# Patient Record
Sex: MALE | Race: WHITE | NOT HISPANIC OR LATINO | URBAN - METROPOLITAN AREA
[De-identification: names, ages, dates, MRNs, and addresses within clinical notes are randomized per-mention and may not be internally consistent; named-entity substitution may affect disease eponyms.]

---

## 2017-04-01 ENCOUNTER — OUTPATIENT (OUTPATIENT)
Dept: OUTPATIENT SERVICES | Facility: HOSPITAL | Age: 57
LOS: 1 days | End: 2017-04-01
Payer: MEDICAID

## 2017-04-01 DIAGNOSIS — S02.10XA UNSPECIFIED FRACTURE OF BASE OF SKULL, INITIAL ENCOUNTER FOR CLOSED FRACTURE: Chronic | ICD-10-CM

## 2017-04-01 DIAGNOSIS — S72.90XA UNSPECIFIED FRACTURE OF UNSPECIFIED FEMUR, INITIAL ENCOUNTER FOR CLOSED FRACTURE: Chronic | ICD-10-CM

## 2017-04-01 DIAGNOSIS — Z98.89 OTHER SPECIFIED POSTPROCEDURAL STATES: Chronic | ICD-10-CM

## 2017-04-13 DIAGNOSIS — R69 ILLNESS, UNSPECIFIED: ICD-10-CM

## 2017-06-01 PROCEDURE — G9001: CPT

## 2017-07-12 ENCOUNTER — EMERGENCY (EMERGENCY)
Facility: HOSPITAL | Age: 57
LOS: 1 days | Discharge: HOME | End: 2017-07-12
Attending: EMERGENCY MEDICINE | Admitting: EMERGENCY MEDICINE
Payer: MEDICARE

## 2017-07-12 VITALS
TEMPERATURE: 99 F | RESPIRATION RATE: 16 BRPM | HEIGHT: 64 IN | OXYGEN SATURATION: 98 % | SYSTOLIC BLOOD PRESSURE: 106 MMHG | WEIGHT: 139.99 LBS | HEART RATE: 50 BPM | DIASTOLIC BLOOD PRESSURE: 65 MMHG

## 2017-07-12 DIAGNOSIS — Z98.89 OTHER SPECIFIED POSTPROCEDURAL STATES: Chronic | ICD-10-CM

## 2017-07-12 DIAGNOSIS — S72.90XA UNSPECIFIED FRACTURE OF UNSPECIFIED FEMUR, INITIAL ENCOUNTER FOR CLOSED FRACTURE: Chronic | ICD-10-CM

## 2017-07-12 DIAGNOSIS — S02.10XA UNSPECIFIED FRACTURE OF BASE OF SKULL, INITIAL ENCOUNTER FOR CLOSED FRACTURE: Chronic | ICD-10-CM

## 2017-07-12 PROCEDURE — 99283 EMERGENCY DEPT VISIT LOW MDM: CPT | Mod: 25

## 2017-07-12 PROCEDURE — 72110 X-RAY EXAM L-2 SPINE 4/>VWS: CPT | Mod: 26

## 2017-07-12 PROCEDURE — 72110 X-RAY EXAM L-2 SPINE 4/>VWS: CPT

## 2017-07-12 PROCEDURE — 99284 EMERGENCY DEPT VISIT MOD MDM: CPT

## 2017-07-12 RX ORDER — IBUPROFEN 200 MG
600 TABLET ORAL ONCE
Qty: 0 | Refills: 0 | Status: COMPLETED | OUTPATIENT
Start: 2017-07-12 | End: 2017-07-12

## 2017-07-12 RX ORDER — KETOROLAC TROMETHAMINE 30 MG/ML
1 SYRINGE (ML) INJECTION
Qty: 20 | Refills: 0 | OUTPATIENT
Start: 2017-07-12 | End: 2017-07-17

## 2017-07-12 RX ADMIN — Medication 600 MILLIGRAM(S): at 16:59

## 2017-07-12 RX ADMIN — Medication 600 MILLIGRAM(S): at 16:58

## 2017-07-12 NOTE — ED PROVIDER NOTE - OBJECTIVE STATEMENT
57 y/o M pt with history of chronic back pain, HTN, hyperlipidemia presents to the ED c/o back pain. Pt is able to ambulate. Pt states he was lifting some cases of soda yesterday at work and the pain began after that. Denies numbness/tingling, urinary or defecatory symptoms. No further complaints at this time.

## 2017-07-12 NOTE — ED PROVIDER NOTE - MUSCULOSKELETAL, MLM
Spine appears normal, range of motion is not limited. able to ambulate and sit up. tenderness upon palpation to lumbar area of back.

## 2021-04-05 ENCOUNTER — APPOINTMENT (OUTPATIENT)
Dept: ORTHOPEDIC SURGERY | Facility: CLINIC | Age: 61
End: 2021-04-05

## 2021-04-06 ENCOUNTER — APPOINTMENT (OUTPATIENT)
Dept: ORTHOPEDIC SURGERY | Facility: CLINIC | Age: 61
End: 2021-04-06

## 2021-04-06 VITALS
OXYGEN SATURATION: 99 % | HEART RATE: 103 BPM | DIASTOLIC BLOOD PRESSURE: 69 MMHG | SYSTOLIC BLOOD PRESSURE: 100 MMHG | HEIGHT: 66 IN | WEIGHT: 154.32 LBS | BODY MASS INDEX: 24.8 KG/M2

## 2021-04-06 DIAGNOSIS — Z72.3 LACK OF PHYSICAL EXERCISE: ICD-10-CM

## 2021-04-06 DIAGNOSIS — Z87.19 PERSONAL HISTORY OF OTHER DISEASES OF THE DIGESTIVE SYSTEM: ICD-10-CM

## 2021-04-06 DIAGNOSIS — Z78.9 OTHER SPECIFIED HEALTH STATUS: ICD-10-CM

## 2021-04-06 DIAGNOSIS — Z87.448 PERSONAL HISTORY OF OTHER DISEASES OF URINARY SYSTEM: ICD-10-CM

## 2021-04-06 DIAGNOSIS — Z86.79 PERSONAL HISTORY OF OTHER DISEASES OF THE CIRCULATORY SYSTEM: ICD-10-CM

## 2021-04-06 DIAGNOSIS — Z60.2 PROBLEMS RELATED TO LIVING ALONE: ICD-10-CM

## 2021-04-06 DIAGNOSIS — F17.210 NICOTINE DEPENDENCE, CIGARETTES, UNCOMPLICATED: ICD-10-CM

## 2021-04-06 DIAGNOSIS — Z56.0 UNEMPLOYMENT, UNSPECIFIED: ICD-10-CM

## 2021-04-06 PROBLEM — Z00.00 ENCOUNTER FOR PREVENTIVE HEALTH EXAMINATION: Status: ACTIVE | Noted: 2021-04-06

## 2021-04-06 RX ORDER — GUARANA 1000 MG
TABLET ORAL
Refills: 0 | Status: ACTIVE | COMMUNITY

## 2021-04-06 RX ORDER — LISINOPRIL 30 MG/1
TABLET ORAL
Refills: 0 | Status: ACTIVE | COMMUNITY

## 2021-04-06 SDOH — ECONOMIC STABILITY - INCOME SECURITY: UNEMPLOYMENT, UNSPECIFIED: Z56.0

## 2021-04-06 SDOH — SOCIAL STABILITY - SOCIAL INSECURITY: PROBLEMS RELATED TO LIVING ALONE: Z60.2

## 2021-04-09 ENCOUNTER — INPATIENT (INPATIENT)
Facility: HOSPITAL | Age: 61
LOS: 4 days | Discharge: HOME | End: 2021-04-14
Attending: HOSPITALIST | Admitting: HOSPITALIST
Payer: MEDICARE

## 2021-04-09 VITALS
HEART RATE: 88 BPM | TEMPERATURE: 97 F | RESPIRATION RATE: 17 BRPM | OXYGEN SATURATION: 100 % | SYSTOLIC BLOOD PRESSURE: 103 MMHG | DIASTOLIC BLOOD PRESSURE: 77 MMHG

## 2021-04-09 DIAGNOSIS — Z98.890 OTHER SPECIFIED POSTPROCEDURAL STATES: Chronic | ICD-10-CM

## 2021-04-09 DIAGNOSIS — K74.60 UNSPECIFIED CIRRHOSIS OF LIVER: ICD-10-CM

## 2021-04-09 DIAGNOSIS — Z98.89 OTHER SPECIFIED POSTPROCEDURAL STATES: Chronic | ICD-10-CM

## 2021-04-09 DIAGNOSIS — S72.90XA UNSPECIFIED FRACTURE OF UNSPECIFIED FEMUR, INITIAL ENCOUNTER FOR CLOSED FRACTURE: Chronic | ICD-10-CM

## 2021-04-09 DIAGNOSIS — N18.9 CHRONIC KIDNEY DISEASE, UNSPECIFIED: ICD-10-CM

## 2021-04-09 DIAGNOSIS — R14.0 ABDOMINAL DISTENSION (GASEOUS): ICD-10-CM

## 2021-04-09 DIAGNOSIS — S02.10XA UNSPECIFIED FRACTURE OF BASE OF SKULL, INITIAL ENCOUNTER FOR CLOSED FRACTURE: Chronic | ICD-10-CM

## 2021-04-09 DIAGNOSIS — I10 ESSENTIAL (PRIMARY) HYPERTENSION: ICD-10-CM

## 2021-04-09 DIAGNOSIS — Z29.9 ENCOUNTER FOR PROPHYLACTIC MEASURES, UNSPECIFIED: ICD-10-CM

## 2021-04-09 DIAGNOSIS — R94.31 ABNORMAL ELECTROCARDIOGRAM [ECG] [EKG]: ICD-10-CM

## 2021-04-09 LAB
ALBUMIN SERPL ELPH-MCNC: 2.9 G/DL — LOW (ref 3.3–5)
ALP SERPL-CCNC: 80 U/L — SIGNIFICANT CHANGE UP (ref 40–120)
ALT FLD-CCNC: 27 U/L — SIGNIFICANT CHANGE UP (ref 4–41)
ANION GAP SERPL CALC-SCNC: 12 MMOL/L — SIGNIFICANT CHANGE UP (ref 7–14)
APTT BLD: 34.4 SEC — SIGNIFICANT CHANGE UP (ref 27–36.3)
AST SERPL-CCNC: 40 U/L — SIGNIFICANT CHANGE UP (ref 4–40)
BASOPHILS # BLD AUTO: 0.1 K/UL — SIGNIFICANT CHANGE UP (ref 0–0.2)
BASOPHILS NFR BLD AUTO: 1.7 % — SIGNIFICANT CHANGE UP (ref 0–2)
BILIRUB SERPL-MCNC: 1 MG/DL — SIGNIFICANT CHANGE UP (ref 0.2–1.2)
BUN SERPL-MCNC: 21 MG/DL — SIGNIFICANT CHANGE UP (ref 7–23)
CALCIUM SERPL-MCNC: 8.5 MG/DL — SIGNIFICANT CHANGE UP (ref 8.4–10.5)
CHLORIDE SERPL-SCNC: 104 MMOL/L — SIGNIFICANT CHANGE UP (ref 98–107)
CO2 SERPL-SCNC: 17 MMOL/L — LOW (ref 22–31)
CREAT SERPL-MCNC: 1.19 MG/DL — SIGNIFICANT CHANGE UP (ref 0.5–1.3)
EOSINOPHIL # BLD AUTO: 0.05 K/UL — SIGNIFICANT CHANGE UP (ref 0–0.5)
EOSINOPHIL NFR BLD AUTO: 0.9 % — SIGNIFICANT CHANGE UP (ref 0–6)
GLUCOSE SERPL-MCNC: 113 MG/DL — HIGH (ref 70–99)
HCT VFR BLD CALC: 32.9 % — LOW (ref 39–50)
HGB BLD-MCNC: 10.5 G/DL — LOW (ref 13–17)
IANC: 3.22 K/UL — SIGNIFICANT CHANGE UP (ref 1.5–8.5)
INR BLD: 1.24 RATIO — HIGH (ref 0.88–1.16)
LYMPHOCYTES # BLD AUTO: 0.85 K/UL — LOW (ref 1–3.3)
LYMPHOCYTES # BLD AUTO: 14 % — SIGNIFICANT CHANGE UP (ref 13–44)
MCHC RBC-ENTMCNC: 31.9 GM/DL — LOW (ref 32–36)
MCHC RBC-ENTMCNC: 35.1 PG — HIGH (ref 27–34)
MCV RBC AUTO: 110 FL — HIGH (ref 80–100)
MONOCYTES # BLD AUTO: 0.27 K/UL — SIGNIFICANT CHANGE UP (ref 0–0.9)
MONOCYTES NFR BLD AUTO: 4.4 % — SIGNIFICANT CHANGE UP (ref 2–14)
NEUTROPHILS # BLD AUTO: 4.47 K/UL — SIGNIFICANT CHANGE UP (ref 1.8–7.4)
NEUTROPHILS NFR BLD AUTO: 73.7 % — SIGNIFICANT CHANGE UP (ref 43–77)
NT-PROBNP SERPL-SCNC: 757 PG/ML — HIGH
PLATELET # BLD AUTO: 39 K/UL — LOW (ref 150–400)
POTASSIUM SERPL-MCNC: 4.5 MMOL/L — SIGNIFICANT CHANGE UP (ref 3.5–5.3)
POTASSIUM SERPL-SCNC: 4.5 MMOL/L — SIGNIFICANT CHANGE UP (ref 3.5–5.3)
PROT SERPL-MCNC: 7 G/DL — SIGNIFICANT CHANGE UP (ref 6–8.3)
PROTHROM AB SERPL-ACNC: 14 SEC — HIGH (ref 10.6–13.6)
RBC # BLD: 2.99 M/UL — LOW (ref 4.2–5.8)
RBC # FLD: 15.9 % — HIGH (ref 10.3–14.5)
SODIUM SERPL-SCNC: 133 MMOL/L — LOW (ref 135–145)
TROPONIN T, HIGH SENSITIVITY RESULT: 38 NG/L — SIGNIFICANT CHANGE UP
TROPONIN T, HIGH SENSITIVITY RESULT: 39 NG/L — SIGNIFICANT CHANGE UP
WBC # BLD: 6.06 K/UL — SIGNIFICANT CHANGE UP (ref 3.8–10.5)
WBC # FLD AUTO: 6.06 K/UL — SIGNIFICANT CHANGE UP (ref 3.8–10.5)

## 2021-04-09 PROCEDURE — 99285 EMERGENCY DEPT VISIT HI MDM: CPT

## 2021-04-09 PROCEDURE — 99223 1ST HOSP IP/OBS HIGH 75: CPT

## 2021-04-09 PROCEDURE — 71045 X-RAY EXAM CHEST 1 VIEW: CPT | Mod: 26

## 2021-04-09 PROCEDURE — 74177 CT ABD & PELVIS W/CONTRAST: CPT | Mod: 26

## 2021-04-09 RX ORDER — LACTULOSE 10 G/15ML
10 SOLUTION ORAL
Refills: 0 | Status: DISCONTINUED | OUTPATIENT
Start: 2021-04-09 | End: 2021-04-14

## 2021-04-09 RX ORDER — LISINOPRIL 2.5 MG/1
10 TABLET ORAL DAILY
Refills: 0 | Status: DISCONTINUED | OUTPATIENT
Start: 2021-04-09 | End: 2021-04-10

## 2021-04-09 RX ORDER — FUROSEMIDE 40 MG
40 TABLET ORAL DAILY
Refills: 0 | Status: DISCONTINUED | OUTPATIENT
Start: 2021-04-09 | End: 2021-04-10
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
## 2021-04-09 NOTE — H&P ADULT - NSHPPHYSICALEXAM_GEN_ALL_CORE
T(C): 36.7 (04-09-21 @ 20:26), Max: 36.7 (04-09-21 @ 20:26)  HR: 86 (04-09-21 @ 20:26) (86 - 88)  BP: 98/62 (04-09-21 @ 20:26) (93/70 - 103/77)  RR: 16 (04-09-21 @ 20:26) (16 - 17)  SpO2: 100% (04-09-21 @ 20:26) (99% - 100%)  Wt(kg): --  GENERAL: NAD, well-developed  HEAD:  Atraumatic, Normocephalic  EYES: EOMI, PERRLA, conjunctiva and sclera clear  NECK: Supple, No JVD  CHEST/LUNG: Clear to auscultation bilaterally; No wheeze  HEART: Regular rate and rhythm; No murmurs, rubs, or gallops  ABDOMEN: soft, distended, tenderness on deep palpation  EXTREMITIES:  LE swelling bilaterally  PSYCH: AAOx3  NEUROLOGY: non-focal  SKIN: No rashes or lesions
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
## 2021-04-09 NOTE — H&P ADULT - PROBLEM SELECTOR PLAN 5
- Hold DVt ppx before paracentesis. Patient has IVC filter in place confirmed on CT a/p. (patient reported no bleeding history and does not remember the reason for IVC filter placement at present.)  - Precautions ordered.

## 2021-04-09 NOTE — ED ADULT NURSE NOTE - NSIMPLEMENTINTERV_GEN_ALL_ED
Implemented All Universal Safety Interventions:  [place] to call system. Call bell, personal items and telephone within reach. Instruct patient to call for assistance. Room bathroom lighting operational. Non-slip footwear when patient is off stretcher. Physically safe environment: no spills, clutter or unnecessary equipment. Stretcher in lowest position, wheels locked, appropriate side rails in place.

## 2021-04-09 NOTE — H&P ADULT - NSHPREVIEWOFSYSTEMS_GEN_ALL_CORE
CONSTITUTIONAL: weakness, no fevers or chills  EYES/ENT: No visual changes;  No vertigo or throat pain   NECK: No pain or stiffness  RESPIRATORY: sob with exertion  CARDIOVASCULAR: No chest pain or palpitations  GASTROINTESTINAL: abd distension with pain  GENITOURINARY: No dysuria, frequency or hematuria  NEUROLOGICAL: No numbness or weakness  SKIN: No itching, burning, rashes, or lesions   PSYCH: No Depression, no anxiety  All other review of systems is negative unless indicated above.

## 2021-04-09 NOTE — H&P ADULT - PROBLEM SELECTOR PLAN 4
- Noted low voltage on EKG (likely sinus rhythm per writer's read).  - In the context of ascites, concerning for pericardial effusion. Will check TTE.

## 2021-04-09 NOTE — ED ADULT NURSE REASSESSMENT NOTE - NS ED NURSE REASSESS COMMENT FT1
Pt received from day shift RN. Vitals as noted. Pt in no acute distress at this time. Respirations even and unlabored. Comfort measures provided.

## 2021-04-09 NOTE — ED PROVIDER NOTE - ATTENDING CONTRIBUTION TO CARE
I performed a history and physical exam of the patient and discussed their management with the advanced care provider. I reviewed the advanced care provider's note and agree with the documented findings and plan of care. My medical decision making and objective findings are found above.     I performed a history and physical exam of the patient and discussed their management with the resident. I reviewed the resident's note and agree with the documented findings and plan of care. My medical decision making and observations are found above.

## 2021-04-09 NOTE — ED PROVIDER NOTE - CARDIAC, MLM
Normal rate, regular rhythm.  Heart sounds S1, S2.  No murmurs, rubs or gallops. +b/l pitting LE edema.

## 2021-04-09 NOTE — ED ADULT TRIAGE NOTE - CHIEF COMPLAINT QUOTE
Pt c/o L. back radiating to abd pain, abd distention since 3-4 months ago. Denies n/v, fever. Sent my PMD for hx of alcohol cirrhosis, sent for possible paracentesis and further eval.

## 2021-04-09 NOTE — ED PROVIDER NOTE - PROGRESS NOTE DETAILS
Resident Yifan: preliminary evaluation consistent with likely cirrhosis related ascites, not acutely concerning for SBP (no WBC, afebrile, abdominal distension has been present over last 3-4 months) - will probably benefit from in-patient therapeutic para. Case endorsed to Hospitalist (MD James). Stable for tele admission.

## 2021-04-09 NOTE — CHART NOTE - NSCHARTNOTEFT_GEN_A_CORE
Called by RN for soft bp's. Pt has had low bp's since 530pm. Pt asymptomatic. Pt stated that his pressure is usually higher. Pt offers no complaints. Pt afebrile, no wbc.     ICU Vital Signs Last 24 Hrs  T(C): 36.8 (09 Apr 2021 23:13), Max: 36.8 (09 Apr 2021 23:13)  T(F): 98.2 (09 Apr 2021 23:13), Max: 98.2 (09 Apr 2021 23:13)  HR: 87 (09 Apr 2021 23:13) (86 - 89)  BP: 90/61 (09 Apr 2021 23:13) (90/61 - 103/77)  BP(mean): --  ABP: --  ABP(mean): --  RR: 18 (09 Apr 2021 23:13) (16 - 18)  SpO2: 100% (09 Apr 2021 23:13) (99% - 100%)      -continue to monitor bp  -call primary team if sbp <90 or pt should become symptomatic

## 2021-04-09 NOTE — H&P ADULT - NSHPLABSRESULTS_GEN_ALL_CORE
(04-09 @ 18:07)                      10.5  6.06 )-----------( 39                 32.9    Neutrophils = 4.47 (73.7%)  Lymphocytes = 0.85 (14.0%)  Eosinophils = 0.05 (0.9%)  Basophils = 0.10 (1.7%)  Monocytes = 0.27 (4.4%)  Bands = --%    04-09    133<L>  |  104  |  21  ----------------------------<  113<H>  4.5   |  17<L>  |  1.19    Ca    8.5      09 Apr 2021 18:07    TPro  7.0  /  Alb  2.9<L>  /  TBili  1.0  /  DBili  x   /  AST  40  /  ALT  27  /  AlkPhos  80  04-09    ( 09 Apr 2021 18:07 )   PT: 14.0 sec;   INR: 1.24 ratio;    PTT:34.4 sec
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
## 2021-04-09 NOTE — H&P ADULT - PROBLEM SELECTOR PLAN 1
- MELD score 11 with 6% 3 month mortality risk. Will need to consult hepatology in the am.  - C/w lasix for now.  - Will likely require procedure team for paracentesis for symptomatic relief.  - Currently no signs suggestive of SBP or varices.  - Serial abd exam.  - Stool softener as needed.  - Anemia likely ACD in etiology. Will send iron studies in the am to confirm. Monitor for bleeding signs.

## 2021-04-10 LAB
ANION GAP SERPL CALC-SCNC: 13 MMOL/L — SIGNIFICANT CHANGE UP (ref 7–14)
BUN SERPL-MCNC: 23 MG/DL — SIGNIFICANT CHANGE UP (ref 7–23)
CALCIUM SERPL-MCNC: 7.9 MG/DL — LOW (ref 8.4–10.5)
CHLORIDE SERPL-SCNC: 102 MMOL/L — SIGNIFICANT CHANGE UP (ref 98–107)
CO2 SERPL-SCNC: 17 MMOL/L — LOW (ref 22–31)
CREAT SERPL-MCNC: 1.19 MG/DL — SIGNIFICANT CHANGE UP (ref 0.5–1.3)
FERRITIN SERPL-MCNC: 544 NG/ML — HIGH (ref 30–400)
GLUCOSE SERPL-MCNC: 143 MG/DL — HIGH (ref 70–99)
HCV AB S/CO SERPL IA: 0.1 S/CO — SIGNIFICANT CHANGE UP (ref 0–0.99)
HCV AB SERPL-IMP: SIGNIFICANT CHANGE UP
IRON SATN MFR SERPL: 15 % — SIGNIFICANT CHANGE UP (ref 14–50)
IRON SATN MFR SERPL: 33 UG/DL — LOW (ref 45–165)
MAGNESIUM SERPL-MCNC: 1.2 MG/DL — LOW (ref 1.6–2.6)
PHOSPHATE SERPL-MCNC: 3.2 MG/DL — SIGNIFICANT CHANGE UP (ref 2.5–4.5)
POTASSIUM SERPL-MCNC: 3.8 MMOL/L — SIGNIFICANT CHANGE UP (ref 3.5–5.3)
POTASSIUM SERPL-SCNC: 3.8 MMOL/L — SIGNIFICANT CHANGE UP (ref 3.5–5.3)
RBC # BLD: 2.47 M/UL — LOW (ref 4.2–5.8)
RETICS #: 65.5 K/UL — SIGNIFICANT CHANGE UP (ref 25–125)
RETICS/RBC NFR: 2.7 % — HIGH (ref 0.5–2.5)
SARS-COV-2 RNA SPEC QL NAA+PROBE: SIGNIFICANT CHANGE UP
SODIUM SERPL-SCNC: 132 MMOL/L — LOW (ref 135–145)
TIBC SERPL-MCNC: 214 UG/DL — LOW (ref 220–430)
TRANSFERRIN SERPL-MCNC: 164 MG/DL — LOW (ref 200–360)
TROPONIN T, HIGH SENSITIVITY RESULT: 39 NG/L — SIGNIFICANT CHANGE UP
UIBC SERPL-MCNC: 181 UG/DL — SIGNIFICANT CHANGE UP (ref 110–370)

## 2021-04-10 PROCEDURE — 93975 VASCULAR STUDY: CPT | Mod: 26

## 2021-04-10 PROCEDURE — 99232 SBSQ HOSP IP/OBS MODERATE 35: CPT

## 2021-04-10 RX ORDER — LOPERAMIDE HCL 2 MG
2 TABLET ORAL ONCE
Refills: 0 | Status: COMPLETED | OUTPATIENT
Start: 2021-04-10 | End: 2021-04-10

## 2021-04-10 RX ORDER — MAGNESIUM SULFATE 500 MG/ML
2 VIAL (ML) INJECTION ONCE
Refills: 0 | Status: COMPLETED | OUTPATIENT
Start: 2021-04-10 | End: 2021-04-10

## 2021-04-10 RX ORDER — SPIRONOLACTONE 25 MG/1
50 TABLET, FILM COATED ORAL DAILY
Refills: 0 | Status: DISCONTINUED | OUTPATIENT
Start: 2021-04-11 | End: 2021-04-14

## 2021-04-10 RX ORDER — FUROSEMIDE 40 MG
20 TABLET ORAL DAILY
Refills: 0 | Status: DISCONTINUED | OUTPATIENT
Start: 2021-04-11 | End: 2021-04-14

## 2021-04-10 RX ORDER — ACETAMINOPHEN 500 MG
975 TABLET ORAL ONCE
Refills: 0 | Status: COMPLETED | OUTPATIENT
Start: 2021-04-10 | End: 2021-04-10

## 2021-04-10 RX ORDER — LIDOCAINE HCL 20 MG/ML
20 VIAL (ML) INJECTION ONCE
Refills: 0 | Status: DISCONTINUED | OUTPATIENT
Start: 2021-04-10 | End: 2021-04-14

## 2021-04-10 RX ORDER — SPIRONOLACTONE 25 MG/1
50 TABLET, FILM COATED ORAL ONCE
Refills: 0 | Status: COMPLETED | OUTPATIENT
Start: 2021-04-10 | End: 2021-04-10

## 2021-04-10 RX ADMIN — Medication 2 MILLIGRAM(S): at 00:59

## 2021-04-10 RX ADMIN — Medication 975 MILLIGRAM(S): at 22:55

## 2021-04-10 RX ADMIN — SPIRONOLACTONE 50 MILLIGRAM(S): 25 TABLET, FILM COATED ORAL at 18:06

## 2021-04-10 RX ADMIN — Medication 50 GRAM(S): at 18:05

## 2021-04-10 RX ADMIN — Medication 975 MILLIGRAM(S): at 20:49

## 2021-04-10 RX ADMIN — Medication 40 MILLIGRAM(S): at 06:03
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
## 2021-04-10 NOTE — PROGRESS NOTE ADULT - PROBLEM SELECTOR PLAN 1
MELD score 11 with 6% 3 month mortality risk.  Reports has known about cirrhosis but never had ascites.  Came from [facility] 1 wk ago but [place] lived [place].   - c/w Lasix, will change to 20 mg and add spironolactone   - 1.2L fluid restrict  - check Hep B and C serologies   - check RUQ w/ doppler to r/o PVT  - needs hepatology eval for OP f/u  - stool count, lactulose prn, assure daily BM MELD score 11 with 6% 3 month mortality risk.  Reports has known about cirrhosis but never had ascites.  Came from [facility] 1 wk ago [place] lived [place].   - c/w Lasix, will change to 20 mg and add spironolactone   - 1.2L fluid restrict  - check Hep B and C serologies   - check RUQ w/ doppler to r/o PVT  - needs hepatology eval for OP f/u  - stool count, lactulose prn, assure daily BM  - would benefit from diagnostic tap to r/o SBP (low suspicion) & confirm ascites from portal HTN and eventual therapeutic without urgency as pt appears in NAD

## 2021-04-10 NOTE — PROGRESS NOTE ADULT - SUBJECTIVE AND OBJECTIVE BOX
Patient is a 60y old  Male who presents with a chief complaint of SOB    SUBJECTIVE / OVERNIGHT EVENTS:    Feels about same, reports no prior para, fluid accumulating over time  No CP, SOB, f/c/n, reports loose stool yesterday which is not uncommon  No abdominal pain, MELCHOR     MEDICATIONS  (STANDING):  furosemide Tablet 40 milliGRAM(s) Oral daily  lidocaine 1% Injectable 20 milliLiter(s) Local Injection once    MEDICATIONS  (PRN):  lactulose Syrup 10 Gram(s) Oral two times a day PRN constipation    T(C): 36.6 (04-10-21 @ 11:54), Max: 36.8 (04-09-21 @ 23:13)  HR: 90 (04-10-21 @ 11:54) (84 - 100)  BP: 97/68 (04-10-21 @ 11:54) (90/61 - 105/64)  RR: 17 (04-10-21 @ 11:54) (16 - 18)  SpO2: 100% (04-10-21 @ 11:54) (99% - 100%)    I&O's Summary    09 Apr 2021 07:01  -  10 Apr 2021 07:00  --------------------------------------------------------  IN: 900 mL / OUT: 450 mL / NET: 450 mL    PHYSICAL EXAM:  GENERAL:  NAD, thin   CHEST/LUNG: Clear to auscultation bilaterally; No wheeze  HEART: Regular rate and rhythm; No murmurs, rubs, or gallops  ABDOMEN: soft, distended; +BS, fluid wave  EXTREMITIES: warm and well perfused, No clubbing, cyanosis, or edema  PSYCH: AAOx3  NEUROLOGY: non-focal, no asterixis   SKIN: No rashes or lesions    LABS:                        10.5   6.06  )-----------( 39       ( 09 Apr 2021 18:07 )             32.9     04-09    133<L>  |  104  |  21  ----------------------------<  113<H>  4.5   |  17<L>  |  1.19    Ca    8.5      09 Apr 2021 18:07    TPro  7.0  /  Alb  2.9<L>  /  TBili  1.0  /  DBili  x   /  AST  40  /  ALT  27  /  AlkPhos  80  04-09    PT/INR - ( 09 Apr 2021 18:07 )   PT: 14.0 sec;   INR: 1.24 ratio    PTT - ( 09 Apr 2021 18:07 )  PTT:34.4 sec    Consultant(s) Notes Reviewed:    Care Discussed with Consultants/Other Providers:

## 2021-04-11 LAB
ALBUMIN SERPL ELPH-MCNC: 1.9 G/DL — LOW (ref 3.3–5)
ALP SERPL-CCNC: 69 U/L — SIGNIFICANT CHANGE UP (ref 40–120)
ALT FLD-CCNC: 20 U/L — SIGNIFICANT CHANGE UP (ref 4–41)
ANION GAP SERPL CALC-SCNC: 10 MMOL/L — SIGNIFICANT CHANGE UP (ref 7–14)
AST SERPL-CCNC: 32 U/L — SIGNIFICANT CHANGE UP (ref 4–40)
BILIRUB DIRECT SERPL-MCNC: <0.2 MG/DL — SIGNIFICANT CHANGE UP (ref 0–0.2)
BILIRUB INDIRECT FLD-MCNC: >0.2 MG/DL — SIGNIFICANT CHANGE UP (ref 0–1)
BILIRUB SERPL-MCNC: 0.4 MG/DL — SIGNIFICANT CHANGE UP (ref 0.2–1.2)
BLD GP AB SCN SERPL QL: NEGATIVE — SIGNIFICANT CHANGE UP
BUN SERPL-MCNC: 23 MG/DL — SIGNIFICANT CHANGE UP (ref 7–23)
CALCIUM SERPL-MCNC: 7.7 MG/DL — LOW (ref 8.4–10.5)
CHLORIDE SERPL-SCNC: 106 MMOL/L — SIGNIFICANT CHANGE UP (ref 98–107)
CO2 SERPL-SCNC: 14 MMOL/L — LOW (ref 22–31)
COVID-19 SPIKE DOMAIN AB INTERP: NEGATIVE — SIGNIFICANT CHANGE UP
COVID-19 SPIKE DOMAIN ANTIBODY RESULT: 0.4 U/ML — SIGNIFICANT CHANGE UP
CREAT SERPL-MCNC: 1.18 MG/DL — SIGNIFICANT CHANGE UP (ref 0.5–1.3)
FOLATE SERPL-MCNC: 20 NG/ML — HIGH (ref 3.1–17.5)
GIANT PLATELETS BLD QL SMEAR: PRESENT — SIGNIFICANT CHANGE UP
GLUCOSE SERPL-MCNC: 93 MG/DL — SIGNIFICANT CHANGE UP (ref 70–99)
HBV CORE AB SER-ACNC: SIGNIFICANT CHANGE UP
HBV SURFACE AB SER-ACNC: SIGNIFICANT CHANGE UP
HBV SURFACE AG SER-ACNC: SIGNIFICANT CHANGE UP
HCT VFR BLD CALC: 26.2 % — LOW (ref 39–50)
HCT VFR BLD CALC: 27.3 % — LOW (ref 39–50)
HGB BLD-MCNC: 8.2 G/DL — LOW (ref 13–17)
HGB BLD-MCNC: 8.8 G/DL — LOW (ref 13–17)
LG PLATELETS BLD QL AUTO: SLIGHT — SIGNIFICANT CHANGE UP
MAGNESIUM SERPL-MCNC: 1.7 MG/DL — SIGNIFICANT CHANGE UP (ref 1.6–2.6)
MCHC RBC-ENTMCNC: 31.3 GM/DL — LOW (ref 32–36)
MCHC RBC-ENTMCNC: 32.2 GM/DL — SIGNIFICANT CHANGE UP (ref 32–36)
MCHC RBC-ENTMCNC: 34.7 PG — HIGH (ref 27–34)
MCHC RBC-ENTMCNC: 35.3 PG — HIGH (ref 27–34)
MCV RBC AUTO: 109.6 FL — HIGH (ref 80–100)
MCV RBC AUTO: 111 FL — HIGH (ref 80–100)
NRBC # BLD: 0 /100 WBCS — SIGNIFICANT CHANGE UP
NRBC # BLD: 0 /100 WBCS — SIGNIFICANT CHANGE UP
NRBC # FLD: 0 K/UL — SIGNIFICANT CHANGE UP
NRBC # FLD: 0 K/UL — SIGNIFICANT CHANGE UP
OSMOLALITY UR: 453 MOSM/KG — SIGNIFICANT CHANGE UP (ref 50–1200)
PHOSPHATE SERPL-MCNC: 3.2 MG/DL — SIGNIFICANT CHANGE UP (ref 2.5–4.5)
PLAT MORPH BLD: ABNORMAL
PLATELET # BLD AUTO: 25 K/UL — LOW (ref 150–400)
PLATELET # BLD AUTO: 27 K/UL — LOW (ref 150–400)
PLATELET COUNT - ESTIMATE: ABNORMAL
POTASSIUM SERPL-MCNC: 3.9 MMOL/L — SIGNIFICANT CHANGE UP (ref 3.5–5.3)
POTASSIUM SERPL-SCNC: 3.9 MMOL/L — SIGNIFICANT CHANGE UP (ref 3.5–5.3)
PROT SERPL-MCNC: 5.7 G/DL — LOW (ref 6–8.3)
RBC # BLD: 2.36 M/UL — LOW (ref 4.2–5.8)
RBC # BLD: 2.49 M/UL — LOW (ref 4.2–5.8)
RBC # FLD: 15.3 % — HIGH (ref 10.3–14.5)
RBC # FLD: 15.5 % — HIGH (ref 10.3–14.5)
RBC BLD AUTO: NORMAL — SIGNIFICANT CHANGE UP
RH IG SCN BLD-IMP: NEGATIVE — SIGNIFICANT CHANGE UP
SARS-COV-2 IGG+IGM SERPL QL IA: 0.4 U/ML — SIGNIFICANT CHANGE UP
SARS-COV-2 IGG+IGM SERPL QL IA: NEGATIVE — SIGNIFICANT CHANGE UP
SODIUM SERPL-SCNC: 130 MMOL/L — LOW (ref 135–145)
SODIUM UR-SCNC: 28 MMOL/L — SIGNIFICANT CHANGE UP
VIT B12 SERPL-MCNC: 1120 PG/ML — HIGH (ref 200–900)
WBC # BLD: 4.02 K/UL — SIGNIFICANT CHANGE UP (ref 3.8–10.5)
WBC # BLD: 4.21 K/UL — SIGNIFICANT CHANGE UP (ref 3.8–10.5)
WBC # FLD AUTO: 4.02 K/UL — SIGNIFICANT CHANGE UP (ref 3.8–10.5)
WBC # FLD AUTO: 4.21 K/UL — SIGNIFICANT CHANGE UP (ref 3.8–10.5)

## 2021-04-11 PROCEDURE — 99232 SBSQ HOSP IP/OBS MODERATE 35: CPT

## 2021-04-11 RX ORDER — POLYETHYLENE GLYCOL 3350 17 G/17G
17 POWDER, FOR SOLUTION ORAL DAILY
Refills: 0 | Status: DISCONTINUED | OUTPATIENT
Start: 2021-04-11 | End: 2021-04-14

## 2021-04-11 RX ORDER — ACETAMINOPHEN 500 MG
650 TABLET ORAL EVERY 6 HOURS
Refills: 0 | Status: DISCONTINUED | OUTPATIENT
Start: 2021-04-11 | End: 2021-04-14

## 2021-04-11 RX ADMIN — Medication 20 MILLIGRAM(S): at 17:23

## 2021-04-11 RX ADMIN — POLYETHYLENE GLYCOL 3350 17 GRAM(S): 17 POWDER, FOR SOLUTION ORAL at 12:56
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
## 2021-04-11 NOTE — PROGRESS NOTE ADULT - PROBLEM SELECTOR PLAN 1
MELD score 11 with 6% 3 month mortality risk.  Reports has known about cirrhosis but never had ascites.  Came from [facility] 1 wk ago [place] lived [place]   - c/w Lasix 20 and Spirnolactone 50 mg, held for low BP, reassess and given if SBP >90   - 1.2L fluid restrict  - Hep B and C serologies negative  - CTAP with no liver masses  - check RUQ w/ doppler to r/o PVT  - hepatology eval for recs and OP f/u  - stool count, lactulose prn, assure daily BM  - would benefit from diagnostic tap to r/o SBP (low suspicion) & confirm ascites from portal HTN and therapeutic

## 2021-04-12 LAB
ALBUMIN SERPL ELPH-MCNC: 2.3 G/DL — LOW (ref 3.3–5)
ALP SERPL-CCNC: 72 U/L — SIGNIFICANT CHANGE UP (ref 40–120)
ALT FLD-CCNC: 22 U/L — SIGNIFICANT CHANGE UP (ref 4–41)
ANION GAP SERPL CALC-SCNC: 10 MMOL/L — SIGNIFICANT CHANGE UP (ref 7–14)
AST SERPL-CCNC: 36 U/L — SIGNIFICANT CHANGE UP (ref 4–40)
BILIRUB DIRECT SERPL-MCNC: 0.2 MG/DL — SIGNIFICANT CHANGE UP (ref 0–0.2)
BILIRUB INDIRECT FLD-MCNC: 0.4 MG/DL — SIGNIFICANT CHANGE UP (ref 0–1)
BILIRUB SERPL-MCNC: 0.6 MG/DL — SIGNIFICANT CHANGE UP (ref 0.2–1.2)
BUN SERPL-MCNC: 21 MG/DL — SIGNIFICANT CHANGE UP (ref 7–23)
CALCIUM SERPL-MCNC: 8.2 MG/DL — LOW (ref 8.4–10.5)
CHLORIDE SERPL-SCNC: 104 MMOL/L — SIGNIFICANT CHANGE UP (ref 98–107)
CO2 SERPL-SCNC: 16 MMOL/L — LOW (ref 22–31)
CREAT SERPL-MCNC: 1.16 MG/DL — SIGNIFICANT CHANGE UP (ref 0.5–1.3)
GLUCOSE SERPL-MCNC: 105 MG/DL — HIGH (ref 70–99)
HCT VFR BLD CALC: 29.9 % — LOW (ref 39–50)
HGB BLD-MCNC: 9.5 G/DL — LOW (ref 13–17)
HIV 1+2 AB+HIV1 P24 AG SERPL QL IA: SIGNIFICANT CHANGE UP
INR BLD: 1.18 RATIO — HIGH (ref 0.88–1.16)
MAGNESIUM SERPL-MCNC: 1.5 MG/DL — LOW (ref 1.6–2.6)
MCHC RBC-ENTMCNC: 31.8 GM/DL — LOW (ref 32–36)
MCHC RBC-ENTMCNC: 34.3 PG — HIGH (ref 27–34)
MCV RBC AUTO: 107.9 FL — HIGH (ref 80–100)
NRBC # BLD: 0 /100 WBCS — SIGNIFICANT CHANGE UP
NRBC # FLD: 0 K/UL — SIGNIFICANT CHANGE UP
PHOSPHATE SERPL-MCNC: 3 MG/DL — SIGNIFICANT CHANGE UP (ref 2.5–4.5)
PLATELET # BLD AUTO: 31 K/UL — LOW (ref 150–400)
POTASSIUM SERPL-MCNC: 3.9 MMOL/L — SIGNIFICANT CHANGE UP (ref 3.5–5.3)
POTASSIUM SERPL-SCNC: 3.9 MMOL/L — SIGNIFICANT CHANGE UP (ref 3.5–5.3)
PROT SERPL-MCNC: 6.6 G/DL — SIGNIFICANT CHANGE UP (ref 6–8.3)
PROTHROM AB SERPL-ACNC: 13.5 SEC — SIGNIFICANT CHANGE UP (ref 10.6–13.6)
RBC # BLD: 2.77 M/UL — LOW (ref 4.2–5.8)
RBC # FLD: 14.9 % — HIGH (ref 10.3–14.5)
SODIUM SERPL-SCNC: 130 MMOL/L — LOW (ref 135–145)
WBC # BLD: 4.81 K/UL — SIGNIFICANT CHANGE UP (ref 3.8–10.5)
WBC # FLD AUTO: 4.81 K/UL — SIGNIFICANT CHANGE UP (ref 3.8–10.5)

## 2021-04-12 PROCEDURE — 93306 TTE W/DOPPLER COMPLETE: CPT | Mod: 26

## 2021-04-12 PROCEDURE — 99223 1ST HOSP IP/OBS HIGH 75: CPT

## 2021-04-12 PROCEDURE — 99233 SBSQ HOSP IP/OBS HIGH 50: CPT

## 2021-04-12 RX ADMIN — Medication 20 MILLIGRAM(S): at 05:24

## 2021-04-12 RX ADMIN — SPIRONOLACTONE 50 MILLIGRAM(S): 25 TABLET, FILM COATED ORAL at 05:24

## 2021-04-12 RX ADMIN — POLYETHYLENE GLYCOL 3350 17 GRAM(S): 17 POWDER, FOR SOLUTION ORAL at 11:29

## 2021-04-12 NOTE — PROGRESS NOTE ADULT - PROBLEM SELECTOR PLAN 1
MELD score 11 with 6% 3 month mortality risk.  Reports has known about cirrhosis but never had ascites.  Came from [facility] 1 wk ago [place].   - c/w Lasix 20 and Spirnolactone 50 mg, held for low BP, reassess and given if SBP >90   - 1.2L fluid restrict  - Hep B and C serologies negative  - CTAP with no liver masses  - check RUQ w/ doppler to r/o PVT  - hepatology eval for recs and OP f/u  - stool count, lactulose prn, assure daily BM  - would benefit from diagnostic tap to r/o SBP (low suspicion) & confirm ascites from portal HTN and therapeutic (may need platelet infusion before)
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
## 2021-04-12 NOTE — CHART NOTE - NSCHARTNOTEFT_GEN_A_CORE
Invasive procedure team was consulted for diagnostic/therapeutic paracentesis due to new onset ascites a/w abd discomfort/SOB. Pt seen and evaluated at bedside. POCUS reveals adequate ascitic fluid pocket RLQ for diagnostic and therapeutic paracentesis. Pt noted to be thrombocytopenic plt 31 this AM. INR 1.18. Plan to obtain AM cbc/coags, transfuse 1u plt tomorrow AM at ~830am with procedure to begin at 9am. No post transfusion CBC required unless otherwise indicated by primary team. Please continue to hold prophylactic pharmacologic anticoagulation. Discussed plan with patient, bedside RN, and primary medical team who verbalized understanding of and agree with this plan.     Tahir Case  EM/IM PGY3  Invasive Procedure Team  [id number]

## 2021-04-12 NOTE — PROGRESS NOTE ADULT - SUBJECTIVE AND OBJECTIVE BOX
Patient is a 60y old  Male who presents with a chief complaint of SOB (12 Apr 2021 08:53)      SUBJECTIVE / OVERNIGHT EVENTS: No overnight event. No complaint this morning.    MEDICATIONS  (STANDING):  furosemide    Tablet 20 milliGRAM(s) Oral daily  lidocaine 1% Injectable 20 milliLiter(s) Local Injection once  polyethylene glycol 3350 17 Gram(s) Oral daily  spironolactone 50 milliGRAM(s) Oral daily    MEDICATIONS  (PRN):  acetaminophen   Tablet .. 650 milliGRAM(s) Oral every 6 hours PRN Temp greater or equal to 38C (100.4F), Mild Pain (1 - 3)  lactulose Syrup 10 Gram(s) Oral two times a day PRN constipation      CAPILLARY BLOOD GLUCOSE        I&O's Summary    11 Apr 2021 07:01  -  12 Apr 2021 07:00  --------------------------------------------------------  IN: 680 mL / OUT: 800 mL / NET: -120 mL    12 Apr 2021 07:01  -  12 Apr 2021 11:50  --------------------------------------------------------  IN: 120 mL / OUT: 200 mL / NET: -80 mL        PHYSICAL EXAM:  Vital Signs Last 24 Hrs  T(C): 36.8 (12 Apr 2021 11:19), Max: 37 (11 Apr 2021 21:37)  T(F): 98.2 (12 Apr 2021 11:19), Max: 98.6 (11 Apr 2021 21:37)  HR: 94 (12 Apr 2021 11:19) (72 - 94)  BP: 90/63 (12 Apr 2021 11:19) (90/63 - 103/68)  BP(mean): --  RR: 18 (12 Apr 2021 11:19) (18 - 18)  SpO2: 99% (12 Apr 2021 11:19) (95% - 99%)  CONSTITUTIONAL: NAD, well-developed, well-groomed  EYES: PERRLA; conjunctiva and sclera clear  ENMT: Moist oral mucosa, no pharyngeal injection or exudates; normal dentition  NECK: Supple, no palpable masses; no thyromegaly  RESPIRATORY: Normal respiratory effort; lungs are clear to auscultation bilaterally  CARDIOVASCULAR: Regular rate and rhythm, normal S1 and S2, no murmur/rub/gallop; No lower extremity edema; Peripheral pulses are 2+ bilaterally  ABDOMEN: Nontender to palpation, +distension, normoactive bowel sounds, no rebound/guarding; No hepatosplenomegaly      LABS:                        9.5    4.81  )-----------( 31       ( 12 Apr 2021 06:38 )             29.9     04-12    130<L>  |  104  |  21  ----------------------------<  105<H>  3.9   |  16<L>  |  1.16    Ca    8.2<L>      12 Apr 2021 06:38  Phos  3.0     04-12  Mg     1.5     04-12    TPro  6.6  /  Alb  2.3<L>  /  TBili  0.6  /  DBili  0.2  /  AST  36  /  ALT  22  /  AlkPhos  72  04-12    PT/INR - ( 12 Apr 2021 06:38 )   PT: 13.5 sec;   INR: 1.18 ratio                     RADIOLOGY & ADDITIONAL TESTS:  Results Reviewed:   Imaging Personally Reviewed:  Electrocardiogram Personally Reviewed:    COORDINATION OF CARE:  Care Discussed with Consultants/Other Providers [Y/N]:  Prior or Outpatient Records Reviewed [Y/N]:

## 2021-04-13 LAB
ALBUMIN FLD-MCNC: <1 G/DL — SIGNIFICANT CHANGE UP
ALBUMIN SERPL ELPH-MCNC: 2.2 G/DL — LOW (ref 3.3–5)
ALP SERPL-CCNC: 70 U/L — SIGNIFICANT CHANGE UP (ref 40–120)
ALT FLD-CCNC: 24 U/L — SIGNIFICANT CHANGE UP (ref 4–41)
ANION GAP SERPL CALC-SCNC: 9 MMOL/L — SIGNIFICANT CHANGE UP (ref 7–14)
AST SERPL-CCNC: 36 U/L — SIGNIFICANT CHANGE UP (ref 4–40)
B PERT IGG+IGM PNL SER: CLEAR — SIGNIFICANT CHANGE UP
BILIRUB SERPL-MCNC: 0.6 MG/DL — SIGNIFICANT CHANGE UP (ref 0.2–1.2)
BLD GP AB SCN SERPL QL: NEGATIVE — SIGNIFICANT CHANGE UP
BUN SERPL-MCNC: 22 MG/DL — SIGNIFICANT CHANGE UP (ref 7–23)
CALCIUM SERPL-MCNC: 8.2 MG/DL — LOW (ref 8.4–10.5)
CHLORIDE SERPL-SCNC: 103 MMOL/L — SIGNIFICANT CHANGE UP (ref 98–107)
CO2 SERPL-SCNC: 17 MMOL/L — LOW (ref 22–31)
COLOR FLD: YELLOW
CREAT SERPL-MCNC: 1.2 MG/DL — SIGNIFICANT CHANGE UP (ref 0.5–1.3)
FERRITIN SERPL-MCNC: 550 NG/ML — HIGH (ref 30–400)
FLUID INTAKE SUBSTANCE CLASS: SIGNIFICANT CHANGE UP
FLUID SEGMENTED GRANULOCYTES: 4 % — SIGNIFICANT CHANGE UP
GLUCOSE FLD-MCNC: 123 MG/DL — SIGNIFICANT CHANGE UP
GLUCOSE SERPL-MCNC: 116 MG/DL — HIGH (ref 70–99)
GRAM STN FLD: SIGNIFICANT CHANGE UP
HCT VFR BLD CALC: 27 % — LOW (ref 39–50)
HGB BLD-MCNC: 8.6 G/DL — LOW (ref 13–17)
IGA FLD-MCNC: 646 MG/DL — HIGH (ref 70–400)
IGG FLD-MCNC: 1532 MG/DL — SIGNIFICANT CHANGE UP (ref 700–1600)
IGM SERPL-MCNC: 125 MG/DL — SIGNIFICANT CHANGE UP (ref 40–230)
INR BLD: 1.27 RATIO — HIGH (ref 0.88–1.16)
IRON SATN MFR SERPL: 18 % — SIGNIFICANT CHANGE UP (ref 14–50)
IRON SATN MFR SERPL: 37 UG/DL — LOW (ref 45–165)
LDH SERPL L TO P-CCNC: 90 U/L — SIGNIFICANT CHANGE UP
LYMPHOCYTES # FLD: 50 % — SIGNIFICANT CHANGE UP
MAGNESIUM SERPL-MCNC: 1.5 MG/DL — LOW (ref 1.6–2.6)
MCHC RBC-ENTMCNC: 31.9 GM/DL — LOW (ref 32–36)
MCHC RBC-ENTMCNC: 34.3 PG — HIGH (ref 27–34)
MCV RBC AUTO: 107.6 FL — HIGH (ref 80–100)
MESOTHL CELL # FLD: 3 % — SIGNIFICANT CHANGE UP
MONOS+MACROS # FLD: 43 % — SIGNIFICANT CHANGE UP
NRBC # BLD: 0 /100 WBCS — SIGNIFICANT CHANGE UP
NRBC # FLD: 0 K/UL — SIGNIFICANT CHANGE UP
PHOSPHATE SERPL-MCNC: 2.9 MG/DL — SIGNIFICANT CHANGE UP (ref 2.5–4.5)
PLATELET # BLD AUTO: 26 K/UL — LOW (ref 150–400)
POTASSIUM SERPL-MCNC: 3.9 MMOL/L — SIGNIFICANT CHANGE UP (ref 3.5–5.3)
POTASSIUM SERPL-SCNC: 3.9 MMOL/L — SIGNIFICANT CHANGE UP (ref 3.5–5.3)
PROT FLD-MCNC: 2.2 G/DL — SIGNIFICANT CHANGE UP
PROT SERPL-MCNC: 6.1 G/DL — SIGNIFICANT CHANGE UP (ref 6–8.3)
PROTHROM AB SERPL-ACNC: 14.4 SEC — HIGH (ref 10.6–13.6)
RBC # BLD: 2.51 M/UL — LOW (ref 4.2–5.8)
RBC # FLD: 14.7 % — HIGH (ref 10.3–14.5)
RCV VOL RI: 1000 CELLS/UL — HIGH (ref 0–5)
RH IG SCN BLD-IMP: NEGATIVE — SIGNIFICANT CHANGE UP
SODIUM SERPL-SCNC: 129 MMOL/L — LOW (ref 135–145)
SPECIMEN SOURCE FLD: SIGNIFICANT CHANGE UP
SPECIMEN SOURCE: SIGNIFICANT CHANGE UP
TIBC SERPL-MCNC: 203 UG/DL — LOW (ref 220–430)
TOTAL NUCLEATED CELL COUNT, BODY FLUID: 199 CELLS/UL — HIGH (ref 0–5)
TUBE TYPE: SIGNIFICANT CHANGE UP
UIBC SERPL-MCNC: 166 UG/DL — SIGNIFICANT CHANGE UP (ref 110–370)
WBC # BLD: 4.43 K/UL — SIGNIFICANT CHANGE UP (ref 3.8–10.5)
WBC # FLD AUTO: 4.43 K/UL — SIGNIFICANT CHANGE UP (ref 3.8–10.5)

## 2021-04-13 PROCEDURE — 99232 SBSQ HOSP IP/OBS MODERATE 35: CPT

## 2021-04-13 PROCEDURE — 49083 ABD PARACENTESIS W/IMAGING: CPT | Mod: GC

## 2021-04-13 PROCEDURE — 99233 SBSQ HOSP IP/OBS HIGH 50: CPT

## 2021-04-13 RX ORDER — ALBUMIN HUMAN 25 %
250 VIAL (ML) INTRAVENOUS ONCE
Refills: 0 | Status: COMPLETED | OUTPATIENT
Start: 2021-04-13 | End: 2021-04-13

## 2021-04-13 RX ORDER — ALBUMIN HUMAN 25 %
100 VIAL (ML) INTRAVENOUS
Refills: 0 | Status: COMPLETED | OUTPATIENT
Start: 2021-04-13 | End: 2021-04-13

## 2021-04-13 RX ORDER — MAGNESIUM SULFATE 500 MG/ML
1 VIAL (ML) INJECTION ONCE
Refills: 0 | Status: COMPLETED | OUTPATIENT
Start: 2021-04-13 | End: 2021-04-13

## 2021-04-13 RX ADMIN — Medication 50 MILLILITER(S): at 16:30

## 2021-04-13 RX ADMIN — Medication 50 MILLILITER(S): at 14:17

## 2021-04-13 RX ADMIN — Medication 125 MILLILITER(S): at 09:44

## 2021-04-13 RX ADMIN — POLYETHYLENE GLYCOL 3350 17 GRAM(S): 17 POWDER, FOR SOLUTION ORAL at 11:42

## 2021-04-13 RX ADMIN — Medication 100 GRAM(S): at 11:42

## 2021-04-13 RX ADMIN — Medication 50 MILLILITER(S): at 12:49

## 2021-04-13 NOTE — PROCEDURE NOTE - ADDITIONAL PROCEDURE DETAILS
Invasive procedure team was consulted for diagnostic/therapeutic paracentesis due to abdominal discomfort. Explained risks (including bleeding, infection, and bowel perforation) and benefits to patient and patient expressed understanding and consented. Ultrasound was utilized and visualized 6 cm ascitic fluid pocket identified w/ no epigastric vessels visualized. No rash or vessels overlying skin site. Pts plts 26 (received 1u plt transfusion prior to procedure), INR 1.27, DVT PPx held over night, not on NOACs or coumadin. Sterile technique was used and 10 mL of 1% lidocaine was used for local anesthesia. Small incision with scalpel done and 18 Fr Arrow Paracentesis catheter used. 9L of clear yellow ascitic fluid drained. Catheter removed and dressed. Pt tolerated procedure well and no complications. Lab Analysis sent. Communicated to primary team.

## 2021-04-13 NOTE — PROGRESS NOTE ADULT - SUBJECTIVE AND OBJECTIVE BOX
Chief Complaint:  Patient is a 60y old  Male who presents with a chief complaint of SOB (2021 11:50)      Interval Events: Pt getting paracentesis when examined. Pt denies nausea, vomiting, abd pain.   ROS: All 12 point system except listed above were otherwise negative.    Allergies:  No Known Allergies        Hospital Medications:  acetaminophen   Tablet .. 650 milliGRAM(s) Oral every 6 hours PRN  furosemide    Tablet 20 milliGRAM(s) Oral daily  lactulose Syrup 10 Gram(s) Oral two times a day PRN  lidocaine 1% Injectable 20 milliLiter(s) Local Injection once  magnesium sulfate  IVPB 1 Gram(s) IV Intermittent once  polyethylene glycol 3350 17 Gram(s) Oral daily  spironolactone 50 milliGRAM(s) Oral daily      PMHX/PSHX:  Cirrhosis    Hypertension    CKD (chronic kidney disease)    S/P craniotomy        Family history:    There is no family history of peptic ulcer disease, gastric cancer, colon polyps, colon cancer, celiac disease, biliary, hepatic, or pancreatic disease.  None of the female relatives have breast, uterine, or ovarian cancer.     PHYSICAL EXAM:   Vital Signs:  Vital Signs Last 24 Hrs  T(C): 36.6 (2021 10:25), Max: 36.8 (2021 05:13)  T(F): 97.9 (2021 10:25), Max: 98.3 (2021 05:13)  HR: 95 (2021 10:25) (89 - 130)  BP: 106/70 (2021 10:25) (96/66 - 106/70)  BP(mean): --  RR: 16 (2021 10:25) (16 - 18)  SpO2: 98% (2021 10:25) (98% - 100%)  Daily     Daily Weight in k.6 (2021 05:13)    ROS:     General:  No wt loss, fevers, chills, night sweats, fatigue  Eyes:  Good vision, no reported pain  ENT:  No sore throat, pain, runny nose, dysphagia  CV:  No pain, palpitations, hypo/hypertension  Pulm:  No dyspnea, cough, tachypnea, wheezing  GI: see above  :  No pain, bleeding, incontinence, nocturia  Muscle:  No pain, weakness  Neuro:  No weakness, tingling, memory problems  Psych:  No fatigue, insomnia, mood problems, depression  Endocrine:  No polyuria, polydipsia, cold/heat intolerance  Heme:  No petechiae, ecchymosis, easy bruisability  Skin:  No rash, tattoos, scars, edema    PHYSICAL EXAM:     GENERAL:  No acute distress  HEENT:  Normocephalic/atraumatic, no scleral icterus, +left eye scar   CHEST:  Clear to auscultation bilaterally, no wheezes/rales/ronchi, no accessory muscle use  HEART:  Regular rate and rhythm, no murmurs/rubs/gallops  ABDOMEN:  Soft, non-tender, +distended, normoactive bowel sounds, +ascites   EXTREMITIES: No cyanosis, clubbing, or edema  SKIN:  No rash/warm/dry  NEURO:  Alert and oriented x 3, no asterixis    LABS:                        8.6    4.43  )-----------(        ( 2021 06:34 )             27.0     Mean Cell Volume: 107.6 fL (-21 @ 06:34)    -    129<L>  |  103  |  22  ----------------------------<  116<H>  3.9   |  17<L>  |  1.20    Ca    8.2<L>      2021 06:34  Phos  2.9       Mg     1.5         TPro  6.1  /  Alb  2.2<L>  /  TBili  0.6  /  DBili  x   /  AST  36  /  ALT  24  /  AlkPhos  70  13    LIVER FUNCTIONS - ( 2021 06:34 )  Alb: 2.2 g/dL / Pro: 6.1 g/dL / ALK PHOS: 70 U/L / ALT: 24 U/L / AST: 36 U/L / GGT: x           PT/INR - ( 2021 06:34 )   PT: 14.4 sec;   INR: 1.27 ratio                                     8.6    4.43  )-----------( 26       ( 2021 06:34 )             27.0                         9.5    4.81  )-----------( 31       ( 2021 06:38 )             29.9                         8.8    4.02  )-----------( 27       ( 2021 09:17 )             27.3                         8.2    4.21  )-----------( 25       ( 2021 07:34 )             26.2     Imaging:             Chief Complaint:  Patient is a 60y old  Male who presents with a chief complaint of SOB (2021 11:50)      Interval Events: Pt getting paracentesis when examined. Pt denies nausea, vomiting, abd pain. ascites present  ROS: All 12 point system except listed above were otherwise negative.    Allergies:  No Known Allergies        Hospital Medications:  acetaminophen   Tablet .. 650 milliGRAM(s) Oral every 6 hours PRN  furosemide    Tablet 20 milliGRAM(s) Oral daily  lactulose Syrup 10 Gram(s) Oral two times a day PRN  lidocaine 1% Injectable 20 milliLiter(s) Local Injection once  magnesium sulfate  IVPB 1 Gram(s) IV Intermittent once  polyethylene glycol 3350 17 Gram(s) Oral daily  spironolactone 50 milliGRAM(s) Oral daily      PMHX/PSHX:  Cirrhosis    Hypertension    CKD (chronic kidney disease)    S/P craniotomy        Family history:    There is no family history of peptic ulcer disease, gastric cancer, colon polyps, colon cancer, celiac disease, biliary, hepatic, or pancreatic disease.  None of the female relatives have breast, uterine, or ovarian cancer.     PHYSICAL EXAM:   Vital Signs:  Vital Signs Last 24 Hrs  T(C): 36.6 (2021 10:25), Max: 36.8 (2021 05:13)  T(F): 97.9 (2021 10:25), Max: 98.3 (2021 05:13)  HR: 95 (2021 10:25) (89 - 130)  BP: 106/70 (2021 10:25) (96/66 - 106/70)  BP(mean): --  RR: 16 (2021 10:25) (16 - 18)  SpO2: 98% (2021 10:25) (98% - 100%)  Daily     Daily Weight in k.6 (2021 05:13)    ROS:     General:  No wt loss, fevers, chills, night sweats, fatigue  Eyes:  Good vision, no reported pain  ENT:  No sore throat, pain, runny nose, dysphagia  CV:  No pain, palpitations, hypo/hypertension  Pulm:  No dyspnea, cough, tachypnea, wheezing  GI: see above  :  No pain, bleeding, incontinence, nocturia  Muscle:  No pain, weakness  Neuro:  No weakness, tingling, memory problems  Psych:  No fatigue, insomnia, mood problems, depression  Endocrine:  No polyuria, polydipsia, cold/heat intolerance  Heme:  No petechiae, ecchymosis, easy bruisability  Skin:  No rash, tattoos, scars, edema    PHYSICAL EXAM:     GENERAL:  No acute distress  HEENT:  Normocephalic/atraumatic, no scleral icterus, +left eye scar   CHEST:  Clear to auscultation bilaterally, no wheezes/rales/ronchi, no accessory muscle use  HEART:  Regular rate and rhythm, no murmurs/rubs/gallops  ABDOMEN:  Soft, non-tender, +distended, normoactive bowel sounds, +ascites   EXTREMITIES: No cyanosis, clubbing, or edema  SKIN:  No rash/warm/dry  NEURO:  Alert and oriented x 3, no asterixis    LABS:                        8.6    4.43  )-----------(        ( 2021 06:34 )             27.0     Mean Cell Volume: 107.6 fL (- @ 06:34)    04-    129<L>  |  103  |  22  ----------------------------<  116<H>  3.9   |  17<L>  |  1.20    Ca    8.2<L>      2021 06:34  Phos  2.9       Mg     1.5         TPro  6.1  /  Alb  2.2<L>  /  TBili  0.6  /  DBili  x   /  AST  36  /  ALT  24  /  AlkPhos  70  -13    LIVER FUNCTIONS - ( 2021 06:34 )  Alb: 2.2 g/dL / Pro: 6.1 g/dL / ALK PHOS: 70 U/L / ALT: 24 U/L / AST: 36 U/L / GGT: x           PT/INR - ( 2021 06:34 )   PT: 14.4 sec;   INR: 1.27 ratio                                     8.6    4.43  )-----------( 26       ( 2021 06:34 )             27.0                         9.5    4.81  )-----------( 31       ( 2021 06:38 )             29.9                         8.8    4.02  )-----------( 27       ( 2021 09:17 )             27.3                         8.2    4.21  )-----------( 25       ( 2021 07:34 )             26.2     Imaging:
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
## 2021-04-13 NOTE — PROGRESS NOTE ADULT - SUBJECTIVE AND OBJECTIVE BOX
Patient is a 60y old  Male who presents with a chief complaint of SOB (13 Apr 2021 11:23)      SUBJECTIVE / OVERNIGHT EVENTS: Denies complaint today. Agreeable to paracentesis.    MEDICATIONS  (STANDING):  albumin human 25% IVPB 100 milliLiter(s) IV Intermittent every 2 hours  furosemide    Tablet 20 milliGRAM(s) Oral daily  lidocaine 1% Injectable 20 milliLiter(s) Local Injection once  polyethylene glycol 3350 17 Gram(s) Oral daily  spironolactone 50 milliGRAM(s) Oral daily    MEDICATIONS  (PRN):  acetaminophen   Tablet .. 650 milliGRAM(s) Oral every 6 hours PRN Temp greater or equal to 38C (100.4F), Mild Pain (1 - 3)  lactulose Syrup 10 Gram(s) Oral two times a day PRN constipation      CAPILLARY BLOOD GLUCOSE        I&O's Summary    12 Apr 2021 07:01  -  13 Apr 2021 07:00  --------------------------------------------------------  IN: 360 mL / OUT: 300 mL / NET: 60 mL        PHYSICAL EXAM:  Vital Signs Last 24 Hrs  T(C): 36.6 (13 Apr 2021 10:25), Max: 36.8 (13 Apr 2021 05:13)  T(F): 97.9 (13 Apr 2021 10:25), Max: 98.3 (13 Apr 2021 05:13)  HR: 95 (13 Apr 2021 10:25) (89 - 130)  BP: 106/70 (13 Apr 2021 10:25) (96/66 - 106/70)  BP(mean): --  RR: 16 (13 Apr 2021 10:25) (16 - 18)  SpO2: 98% (13 Apr 2021 10:25) (98% - 100%)  CONSTITUTIONAL: NAD, well-developed, well-groomed  EYES: PERRLA; conjunctiva and sclera clear  ENMT: Moist oral mucosa, no pharyngeal injection or exudates; normal dentition  NECK: Supple, no palpable masses; no thyromegaly  RESPIRATORY: Normal respiratory effort; lungs are clear to auscultation bilaterally  CARDIOVASCULAR: Regular rate and rhythm, normal S1 and S2, no murmur/rub/gallop; No lower extremity edema; Peripheral pulses are 2+ bilaterally  ABDOMEN: Nontender to palpation, +distention, normoactive bowel sounds, no rebound/guarding; No hepatosplenomegaly    LABS:                        8.6    4.43  )-----------( 26       ( 13 Apr 2021 06:34 )             27.0     04-13    129<L>  |  103  |  22  ----------------------------<  116<H>  3.9   |  17<L>  |  1.20    Ca    8.2<L>      13 Apr 2021 06:34  Phos  2.9     04-13  Mg     1.5     04-13    TPro  6.1  /  Alb  2.2<L>  /  TBili  0.6  /  DBili  x   /  AST  36  /  ALT  24  /  AlkPhos  70  04-13    PT/INR - ( 13 Apr 2021 06:34 )   PT: 14.4 sec;   INR: 1.27 ratio                     RADIOLOGY & ADDITIONAL TESTS:  Results Reviewed:   Imaging Personally Reviewed:  Electrocardiogram Personally Reviewed:    COORDINATION OF CARE:  Care Discussed with Consultants/Other Providers [Y/N]:  Prior or Outpatient Records Reviewed [Y/N]:
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
## 2021-04-13 NOTE — PROGRESS NOTE ADULT - PROBLEM SELECTOR PLAN 1
MELD score 11 with 6% 3 month mortality risk.  Reports has known about cirrhosis but never had ascites.  Came from [facility] 1 wk ago but previously lived in [place] years.   - c/w Lasix 20 and Spirnolactone 50 mg, held for low BP, reassess and given if SBP >90   - 1.2L fluid restrict  - Hep B and C serologies negative  - CTAP with no liver masses  - check RUQ w/ doppler to r/o PVT  - hepatology eval for recs and OP f/u  - stool count, lactulose prn, assure daily BM  - paracentesis today after platelets and albumin

## 2021-04-14 ENCOUNTER — TRANSCRIPTION ENCOUNTER (OUTPATIENT)
Age: 61
End: 2021-04-14

## 2021-04-14 VITALS
TEMPERATURE: 98 F | SYSTOLIC BLOOD PRESSURE: 92 MMHG | HEART RATE: 88 BPM | OXYGEN SATURATION: 100 % | RESPIRATION RATE: 17 BRPM | DIASTOLIC BLOOD PRESSURE: 62 MMHG

## 2021-04-14 LAB
ANA PAT FLD IF-IMP: ABNORMAL
ANA TITR SER: ABNORMAL
ANION GAP SERPL CALC-SCNC: 11 MMOL/L — SIGNIFICANT CHANGE UP (ref 7–14)
BASOPHILS # BLD AUTO: 0.02 K/UL — SIGNIFICANT CHANGE UP (ref 0–0.2)
BASOPHILS NFR BLD AUTO: 0.6 % — SIGNIFICANT CHANGE UP (ref 0–2)
BUN SERPL-MCNC: 20 MG/DL — SIGNIFICANT CHANGE UP (ref 7–23)
CALCIUM SERPL-MCNC: 8.2 MG/DL — LOW (ref 8.4–10.5)
CHLORIDE SERPL-SCNC: 102 MMOL/L — SIGNIFICANT CHANGE UP (ref 98–107)
CO2 SERPL-SCNC: 18 MMOL/L — LOW (ref 22–31)
CREAT SERPL-MCNC: 1.14 MG/DL — SIGNIFICANT CHANGE UP (ref 0.5–1.3)
EOSINOPHIL # BLD AUTO: 0.06 K/UL — SIGNIFICANT CHANGE UP (ref 0–0.5)
EOSINOPHIL NFR BLD AUTO: 1.7 % — SIGNIFICANT CHANGE UP (ref 0–6)
GLUCOSE SERPL-MCNC: 131 MG/DL — HIGH (ref 70–99)
HCT VFR BLD CALC: 23.9 % — LOW (ref 39–50)
HGB BLD-MCNC: 7.9 G/DL — LOW (ref 13–17)
IANC: 1.79 K/UL — SIGNIFICANT CHANGE UP (ref 1.5–8.5)
IMM GRANULOCYTES NFR BLD AUTO: 0.3 % — SIGNIFICANT CHANGE UP (ref 0–1.5)
LYMPHOCYTES # BLD AUTO: 1.17 K/UL — SIGNIFICANT CHANGE UP (ref 1–3.3)
LYMPHOCYTES # BLD AUTO: 32.5 % — SIGNIFICANT CHANGE UP (ref 13–44)
MAGNESIUM SERPL-MCNC: 1.4 MG/DL — LOW (ref 1.6–2.6)
MCHC RBC-ENTMCNC: 33.1 GM/DL — SIGNIFICANT CHANGE UP (ref 32–36)
MCHC RBC-ENTMCNC: 35.6 PG — HIGH (ref 27–34)
MCV RBC AUTO: 107.7 FL — HIGH (ref 80–100)
MITOCHONDRIA AB SER-ACNC: SIGNIFICANT CHANGE UP
MONOCYTES # BLD AUTO: 0.55 K/UL — SIGNIFICANT CHANGE UP (ref 0–0.9)
MONOCYTES NFR BLD AUTO: 15.3 % — HIGH (ref 2–14)
NEUTROPHILS # BLD AUTO: 1.79 K/UL — LOW (ref 1.8–7.4)
NEUTROPHILS NFR BLD AUTO: 49.6 % — SIGNIFICANT CHANGE UP (ref 43–77)
NRBC # BLD: 0 /100 WBCS — SIGNIFICANT CHANGE UP
NRBC # FLD: 0 K/UL — SIGNIFICANT CHANGE UP
PHOSPHATE SERPL-MCNC: 2.4 MG/DL — LOW (ref 2.5–4.5)
PLATELET # BLD AUTO: 31 K/UL — LOW (ref 150–400)
POTASSIUM SERPL-MCNC: 4.1 MMOL/L — SIGNIFICANT CHANGE UP (ref 3.5–5.3)
POTASSIUM SERPL-SCNC: 4.1 MMOL/L — SIGNIFICANT CHANGE UP (ref 3.5–5.3)
RBC # BLD: 2.22 M/UL — LOW (ref 4.2–5.8)
RBC # FLD: 14.6 % — HIGH (ref 10.3–14.5)
SMOOTH MUSCLE AB SER-ACNC: SIGNIFICANT CHANGE UP
SODIUM SERPL-SCNC: 131 MMOL/L — LOW (ref 135–145)
WBC # BLD: 3.6 K/UL — LOW (ref 3.8–10.5)
WBC # FLD AUTO: 3.6 K/UL — LOW (ref 3.8–10.5)

## 2021-04-14 PROCEDURE — 99233 SBSQ HOSP IP/OBS HIGH 50: CPT

## 2021-04-14 PROCEDURE — 99232 SBSQ HOSP IP/OBS MODERATE 35: CPT | Mod: GC

## 2021-04-14 RX ORDER — FUROSEMIDE 40 MG
1 TABLET ORAL
Qty: 30 | Refills: 0
Start: 2021-04-14 | End: 2021-05-13

## 2021-04-14 RX ORDER — LACTULOSE 10 G/15ML
15 SOLUTION ORAL
Qty: 900 | Refills: 0
Start: 2021-04-14 | End: 2021-05-13

## 2021-04-14 RX ORDER — POLYETHYLENE GLYCOL 3350 17 G/17G
17 POWDER, FOR SOLUTION ORAL
Qty: 510 | Refills: 0
Start: 2021-04-14 | End: 2021-05-13

## 2021-04-14 RX ORDER — SPIRONOLACTONE 25 MG/1
3 TABLET, FILM COATED ORAL
Qty: 0 | Refills: 0 | DISCHARGE
Start: 2021-04-14 | End: 2021-05-13

## 2021-04-14 RX ORDER — MAGNESIUM SULFATE 500 MG/ML
2 VIAL (ML) INJECTION ONCE
Refills: 0 | Status: COMPLETED | OUTPATIENT
Start: 2021-04-14 | End: 2021-04-14

## 2021-04-14 RX ORDER — SPIRONOLACTONE 25 MG/1
2 TABLET, FILM COATED ORAL
Qty: 60 | Refills: 0
Start: 2021-04-14 | End: 2021-05-13

## 2021-04-14 RX ADMIN — Medication 50 GRAM(S): at 14:49

## 2021-04-14 RX ADMIN — SPIRONOLACTONE 50 MILLIGRAM(S): 25 TABLET, FILM COATED ORAL at 04:54

## 2021-04-14 RX ADMIN — Medication 20 MILLIGRAM(S): at 04:53

## 2021-04-14 RX ADMIN — SPIRONOLACTONE 50 MILLIGRAM(S): 25 TABLET, FILM COATED ORAL at 04:53

## 2021-04-14 RX ADMIN — Medication 20 MILLIGRAM(S): at 04:54

## 2021-04-14 RX ADMIN — POLYETHYLENE GLYCOL 3350 17 GRAM(S): 17 POWDER, FOR SOLUTION ORAL at 09:01

## 2021-04-14 NOTE — DISCHARGE NOTE NURSING/CASE MANAGEMENT/SOCIAL WORK - PATIENT PORTAL LINK FT
You can access the FollowMyHealth Patient Portal offered by [facility] by registering at the following website: [email or web address]. By joining [employer]’s FollowMyHealth portal, you will also be able to view your health information using other applications (apps) compatible with our system.

## 2021-04-14 NOTE — DISCHARGE NOTE PROVIDER - INSTRUCTIONS
You may eat: Fruits and vegetables (without butter, oil, or salt), Eggs, egg whites, Cooked fish (salmon, tuna), Lean chicken or turkey (without the skin), Low-fat Greek yogurt, Cream cheese, ricotta, Hard cheeses (cheddar, mozzarella), Nuts and seeds (unsalted), Dried beans and legumes, Nut butters (unsalted), Tofu, Fortified milk alternatives (almond, soy, rice), Margarine, Oats, Whole grain bread, crackers, and cereals, Brown rice, Olive oil, Fresh herbs, Low-fat milk, Garlic, Beti, Quinoa, couscous, Granola and cereal bars, Coconut water

## 2021-04-14 NOTE — DISCHARGE NOTE PROVIDER - PROVIDER TOKENS
FREE:[LAST:[Hepatology],PHONE:[phone]],FAX:[(   )    -]],FREE:[LAST:[Your Primary Care Physician],PHONE:[(   )    -],FAX:[(   )    -]]

## 2021-04-14 NOTE — DISCHARGE NOTE PROVIDER - NSDCMRMEDTOKEN_GEN_ALL_CORE_FT
furosemide 20 mg oral tablet: 1 tab(s) orally once a day  lactulose 10 g/15 mL oral syrup: 15 milliliter(s) orally 2 times a day, As needed, to ensure 2 bowel movements daily  polyethylene glycol 3350 oral powder for reconstitution: 17 gram(s) orally once a day  spironolactone 25 mg oral tablet: 2 tab(s) orally once a day

## 2021-04-14 NOTE — PROVIDER CONTACT NOTE (OTHER) - ACTION/TREATMENT ORDERED:
continue to monitor. Inform PA If HR sustained > 100 again.
Monitor BP
Continue to monitor
Continue to monitor

## 2021-04-14 NOTE — PROGRESS NOTE ADULT - PROBLEM SELECTOR PLAN 2
- monitor renal functions, avoid nephrotoxins & renally dose meds  - dc lisinopril as needs BP room for diuretics

## 2021-04-14 NOTE — PROGRESS NOTE ADULT - SUBJECTIVE AND OBJECTIVE BOX
Chief Complaint:  Patient is a 60y old  Male who presents with a chief complaint of SOB (2021 10:59)      Interval Events: Pt s/p paracentesis yesterday removing 9L. Pt currently denies nausea, vomiting, abd pain. Complaining of back pain and itchiness.   ROS: All 12 point system except listed above were otherwise negative.    Allergies:  No Known Allergies        Hospital Medications:  acetaminophen   Tablet .. 650 milliGRAM(s) Oral every 6 hours PRN  furosemide    Tablet 20 milliGRAM(s) Oral daily  lactulose Syrup 10 Gram(s) Oral two times a day PRN  lidocaine 1% Injectable 20 milliLiter(s) Local Injection once  polyethylene glycol 3350 17 Gram(s) Oral daily  spironolactone 50 milliGRAM(s) Oral daily      PMHX/PSHX:  Cirrhosis    Hypertension    CKD (chronic kidney disease)    S/P craniotomy        Family history:    There is no family history of peptic ulcer disease, gastric cancer, colon polyps, colon cancer, celiac disease, biliary, hepatic, or pancreatic disease.  None of the female relatives have breast, uterine, or ovarian cancer.     PHYSICAL EXAM:   Vital Signs:  Vital Signs Last 24 Hrs  T(C): 37.2 (2021 04:50), Max: 37.2 (2021 04:50)  T(F): 98.9 (2021 04:50), Max: 98.9 (2021 04:50)  HR: 97 (2021 04:50) (97 - 97)  BP: 97/64 (2021 04:50) (96/51 - 97/64)  BP(mean): --  RR: 16 (2021 04:50) (16 - 16)  SpO2: 99% (2021 04:50) (99% - 99%)  Daily     Daily Weight in k.5 (2021 04:50)    GENERAL:  No acute distress  HEENT:  Normocephalic/atraumatic, no scleral icterus, +left eye scar   CHEST:  Clear to auscultation bilaterally, no wheezes/rales/ronchi, no accessory muscle use  HEART:  Regular rate and rhythm, no murmurs/rubs/gallops  ABDOMEN:  Soft, non-tender, +distended, normoactive bowel sounds, +ascites   EXTREMITIES: No cyanosis, clubbing, or edema  SKIN:  No rash/warm/dry  NEURO:  Alert and oriented x 3, no asterixis    LABS:                        7.9    3.60  )-----------( 31       ( 2021 06:28 )             23.9     Mean Cell Volume: 107.7 fL (- @ 06:28)    -14    131<L>  |  102  |  20  ----------------------------<  131<H>  4.1   |  18<L>  |  1.14    Ca    8.2<L>      2021 06:28  Phos  2.4       Mg     1.4         TPro  6.1  /  Alb  2.2<L>  /  TBili  0.6  /  DBili  x   /  AST  36  /  ALT  24  /  AlkPhos  70  04-13    LIVER FUNCTIONS - ( 2021 06:34 )  Alb: 2.2 g/dL / Pro: 6.1 g/dL / ALK PHOS: 70 U/L / ALT: 24 U/L / AST: 36 U/L / GGT: x           PT/INR - ( 2021 06:34 )   PT: 14.4 sec;   INR: 1.27 ratio                                     7.9    3.60  )-----------( 31       ( 2021 06:28 )             23.9                         8.6    4.43  )-----------( 26       ( 2021 06:34 )             27.0                         9.5    4.81  )-----------( 31       ( 2021 06:38 )             29.9     Imaging:

## 2021-04-14 NOTE — PROGRESS NOTE ADULT - SUBJECTIVE AND OBJECTIVE BOX
Patient is a 60y old  Male who presents with a chief complaint of SOB (13 Apr 2021 11:51)      SUBJECTIVE / OVERNIGHT EVENTS: Pt doing well s/p paracentesis yesterday.    MEDICATIONS  (STANDING):  furosemide    Tablet 20 milliGRAM(s) Oral daily  lidocaine 1% Injectable 20 milliLiter(s) Local Injection once  polyethylene glycol 3350 17 Gram(s) Oral daily  spironolactone 50 milliGRAM(s) Oral daily    MEDICATIONS  (PRN):  acetaminophen   Tablet .. 650 milliGRAM(s) Oral every 6 hours PRN Temp greater or equal to 38C (100.4F), Mild Pain (1 - 3)  lactulose Syrup 10 Gram(s) Oral two times a day PRN constipation      CAPILLARY BLOOD GLUCOSE        I&O's Summary      PHYSICAL EXAM:  Vital Signs Last 24 Hrs  T(C): 37.2 (14 Apr 2021 04:50), Max: 37.2 (14 Apr 2021 04:50)  T(F): 98.9 (14 Apr 2021 04:50), Max: 98.9 (14 Apr 2021 04:50)  HR: 97 (14 Apr 2021 04:50) (97 - 97)  BP: 97/64 (14 Apr 2021 04:50) (96/51 - 97/64)  BP(mean): --  RR: 16 (14 Apr 2021 04:50) (16 - 16)  SpO2: 99% (14 Apr 2021 04:50) (99% - 99%)  CONSTITUTIONAL: NAD, well-developed, well-groomed  EYES: PERRLA; conjunctiva and sclera clear  ENMT: Moist oral mucosa, no pharyngeal injection or exudates; normal dentition  NECK: Supple, no palpable masses; no thyromegaly  RESPIRATORY: Normal respiratory effort; lungs are clear to auscultation bilaterally  CARDIOVASCULAR: Regular rate and rhythm, normal S1 and S2, no murmur/rub/gallop; No lower extremity edema; Peripheral pulses are 2+ bilaterally  ABDOMEN: Nontender to palpation, +distension, normoactive bowel sounds, no rebound/guarding; No hepatosplenomegaly      LABS:                        7.9    3.60  )-----------( 31       ( 14 Apr 2021 06:28 )             23.9     04-14    131<L>  |  102  |  20  ----------------------------<  131<H>  4.1   |  18<L>  |  1.14    Ca    8.2<L>      14 Apr 2021 06:28  Phos  2.4     04-14  Mg     1.4     04-14    TPro  6.1  /  Alb  2.2<L>  /  TBili  0.6  /  DBili  x   /  AST  36  /  ALT  24  /  AlkPhos  70  04-13    PT/INR - ( 13 Apr 2021 06:34 )   PT: 14.4 sec;   INR: 1.27 ratio                   Culture - Body Fluid with Gram Stain (collected 13 Apr 2021 16:34)  Source: .Body Fluid Peritoneal Fluid  Gram Stain (13 Apr 2021 20:09):    polymorphonuclear leukocytes seen    No organisms seen    by cytocentrifuge        RADIOLOGY & ADDITIONAL TESTS:  Results Reviewed:   Imaging Personally Reviewed:  Electrocardiogram Personally Reviewed:    COORDINATION OF CARE:  Care Discussed with Consultants/Other Providers [Y/N]:  Prior or Outpatient Records Reviewed [Y/N]:
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
## 2021-04-14 NOTE — DISCHARGE NOTE PROVIDER - NSDCCPCAREPLAN_GEN_ALL_CORE_FT
PRINCIPAL DISCHARGE DIAGNOSIS  Diagnosis: Cirrhosis  Assessment and Plan of Treatment: You were seen by the Hepatology team for cirrhosis. Continue Lasix 20mg daily and Spironolactone 50mg daily. 1.2L fluid restriction daily. You underwent paracentesis and 9 liters of fluid was removed from your abdomen, fluid consistent with cirrhosis of the liver. Your Hepatitis B and C serologies are negative. CT scan did not show any liver masses. Take Lactulose as needed to ensure 2 daily bowel movements. You will need a Liver transplant evaluation as an outpatient. At this time there is no urgent need for an evaluation in the hospital. Please call [phone] to schedule a liver appointment upon discharge. Alcohol cessation.  Foods to AVOID: Alcohol, Raw or partially raw fish and shellfish (e.g., oysters, clams), Fast food, fried food, Red meat, Canned food (meat, soup, vegetables), Packaged, processed snacks and meals (incl. frozen), Hot dogs, sausage, lunchmeat, Sauerkraut, pickles, Buttermilk, Tomato sauce or paste, Instant hot cereal or oatmeal, Potato chips, pretzels, rice cakes, crackers, popcorn, Refined white flour pasta, bread, and white rice, Oils high in trans fat or partially hydrogenated oils (palm oil, coconut oil), Breading, coating, and stuffing mixes, Full-fat dairy products, Bread, biscuit, pancake, and baked good mixes, Pastries, cake, cookies, muffins, doughnuts, American, Parmesan, Swiss, blue, feta, cottage cheese, cheese slices or spreads, Pudding, custard, or frosting mixes, Table salt, sea salt, mixed seasonings, Ketchup, soy sauce, salsa, salad dressing, steak sauce, Bouillon cubes, broth, gravy, and stock, Caffeinated tea, coffee, and soft drinks.      SECONDARY DISCHARGE DIAGNOSES  Diagnosis: Essential hypertension  Assessment and Plan of Treatment: Hold lisinopril as your blood pressure has been on the lower side and you need room for diuretics (lasix and spironolactone) for the liver. Follow up with your Primary Care Physician upon discharge from the hospital.

## 2021-04-14 NOTE — DISCHARGE NOTE PROVIDER - CARE PROVIDER_API CALL
Hepatology,   Phone: [phone]  Fax: (   )    -  Follow Up Time:     Your Primary Care Physician,   Phone: (   )    -  Fax: (   )    -  Follow Up Time:

## 2021-04-14 NOTE — PROGRESS NOTE ADULT - PROBLEM SELECTOR PLAN 3
BP low  - hold lisinopril & monitor BP
- hold lisinopril & monitor BP
BP low  - hold lisinopril & monitor BP
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
## 2021-04-14 NOTE — PROGRESS NOTE ADULT - PROBLEM SELECTOR PLAN 1
MELD score 11 with 6% 3 month mortality risk.  Reports has known about cirrhosis but never had ascites.  Came from [facility] 1 wk ago [place] lived in [place]   - c/w Lasix 20 and Spirnolactone 50 mg, held for low BP, reassess and given if SBP >90   - 1.2L fluid restrict  - Hep B and C serologies negative  - CTAP with no liver masses  - check RUQ w/ doppler to r/o PVT  - hepatology eval for recs and OP f/u  - stool count, lactulose prn, assure daily BM  - paracentesis today after platelets and albumin

## 2021-04-14 NOTE — PROVIDER CONTACT NOTE (OTHER) - ASSESSMENT
Patient denies chest pain or SOB. Patient denies dizziness or lightheadedness.
HR was 130, other vitals stable. Pt is asymptomatic. Patient sustained for a short period of time.
BP: 96/51, asymptomatic, No c/o headache, dizziness, distress. SBP:96 within patient's baseline range.
BP: 97/64, asymptomatic, No c/o headache, dizziness, distress. SBP:97 within patient's baseline range.

## 2021-04-14 NOTE — PROGRESS NOTE ADULT - PROBLEM SELECTOR PLAN 4
Low voltage  - TTE ordered for r/o pericardial effusion

## 2021-04-15 LAB
IGG SERPL-MCNC: 1586 MG/DL — SIGNIFICANT CHANGE UP (ref 603–1613)
IGG1 SER-MCNC: 987 MG/DL — HIGH (ref 248–810)
IGG2 SER-MCNC: 246 MG/DL — SIGNIFICANT CHANGE UP (ref 130–555)
IGG3 SER-MCNC: 98 MG/DL — SIGNIFICANT CHANGE UP (ref 15–102)
IGG4 SER-MCNC: 66 MG/DL — SIGNIFICANT CHANGE UP (ref 2–96)

## 2021-04-18 LAB
CULTURE RESULTS: SIGNIFICANT CHANGE UP
SPECIMEN SOURCE: SIGNIFICANT CHANGE UP

## 2021-05-10 ENCOUNTER — INPATIENT (INPATIENT)
Facility: HOSPITAL | Age: 61
LOS: 2 days | Discharge: HOME | End: 2021-05-13
Attending: HOSPITALIST | Admitting: HOSPITALIST
Payer: MEDICARE

## 2021-05-10 VITALS
HEART RATE: 98 BPM | TEMPERATURE: 98 F | RESPIRATION RATE: 18 BRPM | DIASTOLIC BLOOD PRESSURE: 83 MMHG | OXYGEN SATURATION: 100 % | HEIGHT: 66 IN | SYSTOLIC BLOOD PRESSURE: 113 MMHG

## 2021-05-10 DIAGNOSIS — S72.90XA UNSPECIFIED FRACTURE OF UNSPECIFIED FEMUR, INITIAL ENCOUNTER FOR CLOSED FRACTURE: Chronic | ICD-10-CM

## 2021-05-10 DIAGNOSIS — Z98.89 OTHER SPECIFIED POSTPROCEDURAL STATES: Chronic | ICD-10-CM

## 2021-05-10 DIAGNOSIS — Z98.890 OTHER SPECIFIED POSTPROCEDURAL STATES: Chronic | ICD-10-CM

## 2021-05-10 DIAGNOSIS — S02.10XA UNSPECIFIED FRACTURE OF BASE OF SKULL, INITIAL ENCOUNTER FOR CLOSED FRACTURE: Chronic | ICD-10-CM

## 2021-05-10 PROCEDURE — 99285 EMERGENCY DEPT VISIT HI MDM: CPT

## 2021-05-10 NOTE — ED ADULT NURSE NOTE - OBJECTIVE STATEMENT
Patient arrives to the ED for increasingly worsening abdominal distention over the past 3 weeks. A&Ox4. Patient reports he last had paracentesis three weeks. Hx liver cirrhosis. Belly distended but soft and nontender. Patient reports he has some discomfort intermittently to the abdomen but none right now.  Patient breathing even and nonlabored. No swelling noted to bilateral lower extremities. Patient able to ambulate. Denies any chest pain, SOB, dizziness, dysuria, constipation, or other concerns. 20g IV placed to left arm. Labs sent as ordered. COVID swab sent. Safety maintained. Patient stable upon exiting the room.

## 2021-05-10 NOTE — ED PROVIDER NOTE - CLINICAL SUMMARY MEDICAL DECISION MAKING FREE TEXT BOX
60 year old male with pmhx of alcoholic cirrhosis, sober for 4 months, presents to ED for evaluation of abdominal distention. Pt without pain, chills, or fevers. Likely needs paracentesis. Awaiting labs for Creatinine

## 2021-05-10 NOTE — ED PROVIDER NOTE - PHYSICAL EXAMINATION
General: well appearing, no acute distress, AOx3  Skin: normal color for race, no rash  Head: normocephalic, atraumatic  Eyes: clear conjunctiva, EOMI  ENMT: airway patent, no nasal discharge  Cardiovascular: normal rate, normal rhythm, S1/S2  Pulmonary: clear to auscultation bilaterally, no rales, rhonchi, or wheeze  Abdomen: nontender, distended, firm  Musculoskeletal: moving extremities well, no deformity  Psych: normal mood, normal affect

## 2021-05-10 NOTE — ED ADULT TRIAGE NOTE - CHIEF COMPLAINT QUOTE
pt c/o abdominal distension x1 week. Pt hx cirrhosis and ascites requiring paracentesis 3 weeks. Pt denies any pain, diarrhea, constipation, n/v, urinary symptoms.
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
## 2021-05-10 NOTE — ED PROVIDER NOTE - NS ED ROS FT
General: no fever, no chills  Eyes: no vision changes, no eye pain  Cardiovascular: no chest pain, no edema  Respiratory: no cough, no shortness of breath  Gastrointestinal: +abd distention, no nausea, no vomiting, no diarrhea, no abd pain   Genitourinary: no dysuria, no hematuria  Musculoskeletal: no muscle pain, no joint pain  Skin: no rash, no lesions  Neuro: no numbness, no tingling  Psych: no depression, no anxiety
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
## 2021-05-10 NOTE — ED ADULT NURSE NOTE - NS ED NURSE DISCH DISPOSITION
Name And Contact Information For Health Care Proxy: Makayla Marrero\\nMother\\[phone] Quality 154 Part A: Falls: Risk Assessment (Should Be Reported With Measure 155.): Falls risk assessment completed and documented in the past 12 months. Quality 431: Preventive Care And Screening: Unhealthy Alcohol Use - Screening: Patient screened for unhealthy alcohol use using a single question and scores less than 2 times per year Admitted Quality 226: Preventive Care And Screening: Tobacco Use: Screening And Cessation Intervention: Patient screened for tobacco use, is a smoker AND received Cessation Counseling Detail Level: Detailed Quality 134: Screening For Clinical Depression And Follow-Up Plan: The patient was screened for depression and the screen was negative and no follow up required Quality 154 Part B: Falls: Risk Screening (Should Be Reported With Measure 155.): No documentation of falls status Quality 130: Documentation Of Current Medications In The Medical Record: Current Medications Documented Quality 131: Pain Assessment And Follow-Up: Pain assessment using a standardized tool is documented as negative, no follow-up plan required

## 2021-05-11 DIAGNOSIS — Z29.9 ENCOUNTER FOR PROPHYLACTIC MEASURES, UNSPECIFIED: ICD-10-CM

## 2021-05-11 DIAGNOSIS — I10 ESSENTIAL (PRIMARY) HYPERTENSION: ICD-10-CM

## 2021-05-11 DIAGNOSIS — K74.60 UNSPECIFIED CIRRHOSIS OF LIVER: ICD-10-CM

## 2021-05-11 DIAGNOSIS — R14.0 ABDOMINAL DISTENSION (GASEOUS): ICD-10-CM

## 2021-05-11 DIAGNOSIS — N18.9 CHRONIC KIDNEY DISEASE, UNSPECIFIED: ICD-10-CM

## 2021-05-11 DIAGNOSIS — N17.9 ACUTE KIDNEY FAILURE, UNSPECIFIED: ICD-10-CM

## 2021-05-11 LAB
ALBUMIN SERPL ELPH-MCNC: 2.6 G/DL — LOW (ref 3.3–5)
ALP SERPL-CCNC: 83 U/L — SIGNIFICANT CHANGE UP (ref 40–120)
ALT FLD-CCNC: 17 U/L — SIGNIFICANT CHANGE UP (ref 4–41)
ANION GAP SERPL CALC-SCNC: 10 MMOL/L — SIGNIFICANT CHANGE UP (ref 7–14)
APPEARANCE UR: CLEAR — SIGNIFICANT CHANGE UP
APTT BLD: 30.7 SEC — SIGNIFICANT CHANGE UP (ref 27–36.3)
AST SERPL-CCNC: 26 U/L — SIGNIFICANT CHANGE UP (ref 4–40)
BASOPHILS # BLD AUTO: 0.03 K/UL — SIGNIFICANT CHANGE UP (ref 0–0.2)
BASOPHILS NFR BLD AUTO: 0.7 % — SIGNIFICANT CHANGE UP (ref 0–2)
BILIRUB SERPL-MCNC: 0.6 MG/DL — SIGNIFICANT CHANGE UP (ref 0.2–1.2)
BILIRUB UR-MCNC: NEGATIVE — SIGNIFICANT CHANGE UP
BLD GP AB SCN SERPL QL: NEGATIVE — SIGNIFICANT CHANGE UP
BUN SERPL-MCNC: 19 MG/DL — SIGNIFICANT CHANGE UP (ref 7–23)
CALCIUM SERPL-MCNC: 8.7 MG/DL — SIGNIFICANT CHANGE UP (ref 8.4–10.5)
CHLORIDE SERPL-SCNC: 104 MMOL/L — SIGNIFICANT CHANGE UP (ref 98–107)
CHLORIDE UR-SCNC: 58 MMOL/L — SIGNIFICANT CHANGE UP
CO2 SERPL-SCNC: 19 MMOL/L — LOW (ref 22–31)
COLOR SPEC: YELLOW — SIGNIFICANT CHANGE UP
CREAT ?TM UR-MCNC: 177 MG/DL — SIGNIFICANT CHANGE UP
CREAT ?TM UR-MCNC: 178 MG/DL — SIGNIFICANT CHANGE UP
CREAT SERPL-MCNC: 1.48 MG/DL — HIGH (ref 0.5–1.3)
DIFF PNL FLD: NEGATIVE — SIGNIFICANT CHANGE UP
EOSINOPHIL # BLD AUTO: 0.03 K/UL — SIGNIFICANT CHANGE UP (ref 0–0.5)
EOSINOPHIL NFR BLD AUTO: 0.7 % — SIGNIFICANT CHANGE UP (ref 0–6)
GLUCOSE SERPL-MCNC: 105 MG/DL — HIGH (ref 70–99)
GLUCOSE UR QL: NEGATIVE — SIGNIFICANT CHANGE UP
HCT VFR BLD CALC: 32.1 % — LOW (ref 39–50)
HGB BLD-MCNC: 10.4 G/DL — LOW (ref 13–17)
IANC: 2.07 K/UL — SIGNIFICANT CHANGE UP (ref 1.5–8.5)
IMM GRANULOCYTES NFR BLD AUTO: 0.2 % — SIGNIFICANT CHANGE UP (ref 0–1.5)
INR BLD: 1.28 RATIO — HIGH (ref 0.88–1.16)
KETONES UR-MCNC: NEGATIVE — SIGNIFICANT CHANGE UP
LEUKOCYTE ESTERASE UR-ACNC: NEGATIVE — SIGNIFICANT CHANGE UP
LYMPHOCYTES # BLD AUTO: 1.46 K/UL — SIGNIFICANT CHANGE UP (ref 1–3.3)
LYMPHOCYTES # BLD AUTO: 36 % — SIGNIFICANT CHANGE UP (ref 13–44)
MCHC RBC-ENTMCNC: 32.4 GM/DL — SIGNIFICANT CHANGE UP (ref 32–36)
MCHC RBC-ENTMCNC: 33.8 PG — SIGNIFICANT CHANGE UP (ref 27–34)
MCV RBC AUTO: 104.2 FL — HIGH (ref 80–100)
MONOCYTES # BLD AUTO: 0.46 K/UL — SIGNIFICANT CHANGE UP (ref 0–0.9)
MONOCYTES NFR BLD AUTO: 11.3 % — SIGNIFICANT CHANGE UP (ref 2–14)
NEUTROPHILS # BLD AUTO: 2.07 K/UL — SIGNIFICANT CHANGE UP (ref 1.8–7.4)
NEUTROPHILS NFR BLD AUTO: 51.1 % — SIGNIFICANT CHANGE UP (ref 43–77)
NITRITE UR-MCNC: NEGATIVE — SIGNIFICANT CHANGE UP
NRBC # BLD: 0 /100 WBCS — SIGNIFICANT CHANGE UP
NRBC # FLD: 0 K/UL — SIGNIFICANT CHANGE UP
PH UR: 5.5 — SIGNIFICANT CHANGE UP (ref 5–8)
PLATELET # BLD AUTO: 37 K/UL — LOW (ref 150–400)
POTASSIUM SERPL-MCNC: 5 MMOL/L — SIGNIFICANT CHANGE UP (ref 3.5–5.3)
POTASSIUM SERPL-SCNC: 5 MMOL/L — SIGNIFICANT CHANGE UP (ref 3.5–5.3)
POTASSIUM UR-SCNC: 44.4 MMOL/L — SIGNIFICANT CHANGE UP
PROT ?TM UR-MCNC: 13 MG/DL — SIGNIFICANT CHANGE UP
PROT SERPL-MCNC: 6.9 G/DL — SIGNIFICANT CHANGE UP (ref 6–8.3)
PROT UR-MCNC: NEGATIVE — SIGNIFICANT CHANGE UP
PROT/CREAT UR-RTO: 0.1 RATIO — SIGNIFICANT CHANGE UP (ref 0–0.2)
PROTHROM AB SERPL-ACNC: 14.4 SEC — HIGH (ref 10.6–13.6)
RBC # BLD: 3.08 M/UL — LOW (ref 4.2–5.8)
RBC # FLD: 13.3 % — SIGNIFICANT CHANGE UP (ref 10.3–14.5)
RH IG SCN BLD-IMP: NEGATIVE — SIGNIFICANT CHANGE UP
SARS-COV-2 RNA SPEC QL NAA+PROBE: SIGNIFICANT CHANGE UP
SODIUM SERPL-SCNC: 133 MMOL/L — LOW (ref 135–145)
SODIUM UR-SCNC: 56 MMOL/L — SIGNIFICANT CHANGE UP
SODIUM UR-SCNC: 56 MMOL/L — SIGNIFICANT CHANGE UP
SP GR SPEC: 1.02 — SIGNIFICANT CHANGE UP (ref 1.01–1.02)
UROBILINOGEN FLD QL: SIGNIFICANT CHANGE UP
WBC # BLD: 4.06 K/UL — SIGNIFICANT CHANGE UP (ref 3.8–10.5)
WBC # FLD AUTO: 4.06 K/UL — SIGNIFICANT CHANGE UP (ref 3.8–10.5)

## 2021-05-11 PROCEDURE — 99223 1ST HOSP IP/OBS HIGH 75: CPT | Mod: GC

## 2021-05-11 PROCEDURE — 76770 US EXAM ABDO BACK WALL COMP: CPT | Mod: 26

## 2021-05-11 PROCEDURE — 99222 1ST HOSP IP/OBS MODERATE 55: CPT | Mod: GC

## 2021-05-11 RX ORDER — ALBUMIN HUMAN 25 %
100 VIAL (ML) INTRAVENOUS EVERY 8 HOURS
Refills: 0 | Status: DISCONTINUED | OUTPATIENT
Start: 2021-05-11 | End: 2021-05-13

## 2021-05-11 RX ORDER — SODIUM ZIRCONIUM CYCLOSILICATE 10 G/10G
10 POWDER, FOR SUSPENSION ORAL ONCE
Refills: 0 | Status: COMPLETED | OUTPATIENT
Start: 2021-05-11 | End: 2021-05-11

## 2021-05-11 RX ORDER — LEVOTHYROXINE SODIUM 125 MCG
100 TABLET ORAL DAILY
Refills: 0 | Status: DISCONTINUED | OUTPATIENT
Start: 2021-05-11 | End: 2021-05-13

## 2021-05-11 RX ORDER — FERROUS SULFATE 325(65) MG
325 TABLET ORAL DAILY
Refills: 0 | Status: DISCONTINUED | OUTPATIENT
Start: 2021-05-11 | End: 2021-05-12

## 2021-05-11 RX ORDER — LACTULOSE 10 G/15ML
10 SOLUTION ORAL
Refills: 0 | Status: DISCONTINUED | OUTPATIENT
Start: 2021-05-11 | End: 2021-05-12

## 2021-05-11 RX ORDER — DIPHENHYDRAMINE HCL 50 MG
25 CAPSULE ORAL EVERY 6 HOURS
Refills: 0 | Status: DISCONTINUED | OUTPATIENT
Start: 2021-05-11 | End: 2021-05-13

## 2021-05-11 RX ADMIN — SODIUM ZIRCONIUM CYCLOSILICATE 10 GRAM(S): 10 POWDER, FOR SUSPENSION ORAL at 03:13

## 2021-05-11 RX ADMIN — LACTULOSE 10 GRAM(S): 10 SOLUTION ORAL at 19:13

## 2021-05-11 RX ADMIN — Medication 325 MILLIGRAM(S): at 13:07

## 2021-05-11 RX ADMIN — Medication 25 MILLIGRAM(S): at 13:07

## 2021-05-11 RX ADMIN — Medication 50 MILLILITER(S): at 23:10
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
## 2021-05-11 NOTE — ED ADULT NURSE REASSESSMENT NOTE - NS ED NURSE REASSESS COMMENT FT1
Patient received back from break RN. Patient resting quietly in bed, breathing even and nonlabored. No complaints at this time. No acute distress. No acute distress. Safety maintained. Patient stable upon exiting the room.
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
## 2021-05-11 NOTE — CONSULT NOTE ADULT - SUBJECTIVE AND OBJECTIVE BOX
[facility] DIVISION OF KIDNEY DISEASES AND HYPERTENSION -- [phone]  -- INITIAL CONSULT NOTE  --------------------------------------------------------------------------------  HPI: 60-year-old male with liver cirrhosis, HTN, hypothyroidism was hospitalized at [facility] yesterday (5/10) for abdominal distension. Pt. says he noted increasing abdominal distension so he came to [facility] to get it drained. Nephrology team consulted for YOVANI. Pt. was recently hospitalized at [facility] from 4/9-4/14 for similar complaints. Upon lab review on [facility], Scr was 1.19 on 4/9. Scr remained WNL and was 1.14 on discharge (4/14). Pt. underwent paracentesis during that hospitalization. Pt. says he went to his PCP 2 weeks prior and was told he had elevated creatinine. Pt. was referred to a nephrologist but has not been evaluated yet. On arrival to ER yesterday, Scr was 1.48.     Pt. evaluated at bedside, in no acute distress. Complains of abdominal fullness.    PAST HISTORY  --------------------------------------------------------------------------------  PAST MEDICAL & SURGICAL HISTORY:  Cirrhosis    Hypertension    CKD (chronic kidney disease)    S/P craniotomy    FAMILY HISTORY:  No pertinent family history in first degree relatives    PAST SOCIAL HISTORY: retired. From [place]. Used to work as [employer]    ALLERGIES & MEDICATIONS  --------------------------------------------------------------------------------  Allergies    No Known Allergies    Intolerances    Standing Inpatient Medications  ferrous    sulfate 325 milliGRAM(s) Oral daily  lactulose Syrup 10 Gram(s) Oral two times a day  levothyroxine 100 MICROGram(s) Oral daily    REVIEW OF SYSTEMS  --------------------------------------------------------------------------------  Gen: + weakness  Respiratory: No dyspnea  CV: No chest pain  GI: see HPI  MSK: no LE edema  Neuro: No dizziness  Heme: No bleeding    All other systems were reviewed and are negative, except as noted.    VITALS/PHYSICAL EXAM  --------------------------------------------------------------------------------  T(C): 36.5 (05-11-21 @ 13:05), Max: 36.9 (05-11-21 @ 00:39)  HR: 95 (05-11-21 @ 13:05) (78 - 98)  BP: 119/79 (05-11-21 @ 13:05) (100/66 - 119/79)  RR: 18 (05-11-21 @ 13:05) (18 - 19)  SpO2: 100% (05-11-21 @ 13:05) (97% - 100%)  Wt(kg): --  Height (cm): 167.6 (05-11-21 @ 07:18)  Weight (kg): 65.7 (05-11-21 @ 07:18)  BMI (kg/m2): 23.4 (05-11-21 @ 07:18)  BSA (m2): 1.74 (05-11-21 @ 07:18)    Physical Exam:  	Gen: NAD  	HEENT: MMM  	Pulm: good air entry B/L  	CV: S1S2  	Abd: + distended, ascites +  	Ext: No LE edema B/L  	Neuro: Awake  	Skin: Warm and dry  	  LABS/STUDIES  --------------------------------------------------------------------------------              10.4   4.06  >-----------<  37       [05-11-21 @ 00:23]              32.1     133  |  104  |  19  ----------------------------<  105      [05-11-21 @ 00:23]  5.0   |  19  |  1.48        Ca     8.7     [05-11-21 @ 00:23]    Creatinine Trend:  SCr 1.48 [05-11 @ 00:23]  SCr 1.14 [04-14 @ 06:28]  SCr 1.20 [04-13 @ 06:34]  SCr 1.16 [04-12 @ 06:38]    HBsAb Nonreact      [04-11-21 @ 09:57]  HBsAg Nonreact      [04-11-21 @ 09:57]  HBcAb Nonreact      [04-11-21 @ 09:57]  HCV 0.10, Nonreact      [04-10-21 @ 11:35]  HIV Nonreact      [04-12-21 @ 06:38]
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
## 2021-05-11 NOTE — H&P ADULT - NSHPPHYSICALEXAM_GEN_ALL_CORE
Vital Signs Last 24 Hrs  T(C): 36.7 (11 May 2021 07:18), Max: 36.9 (11 May 2021 00:39)  T(F): 98.1 (11 May 2021 07:18), Max: 98.4 (11 May 2021 00:39)  HR: 78 (11 May 2021 07:18) (78 - 98)  BP: 107/68 (11 May 2021 07:18) (100/66 - 113/83)  BP(mean): --  RR: 19 (11 May 2021 07:18) (18 - 19)  SpO2: 97% (11 May 2021 07:18) (97% - 100%)    PHYSICAL EXAM:  GENERAL: Sitting comfortable in bed, in no acute distress  HENMT: Atraumatic, moist mucous membranes, no oropharyngeal exudates or vesicles, uvula is midline EYES: Clear bilaterally, PERRL, EOMs intact b/l  HEART: RRR, S1/S2, no murmur/gallops/rubs  RESPIRATORY: Clear to auscultation bilaterally, no wheezes/rhonchi/rales  ABDOMEN: +BS, firm and distended, nontender  EXTREMITIES: No lower extremity edema, +2 radial pulses b/l  NEURO:  A&Ox4, no focal motor deficits or sensory deficits   Heme/LYMPH: No ecchymosis or bruising, no anterior/posterior cervical or supraclavicular LAD  SKIN:  Skin normal color for race, warm, dry and intact. No evidence of rash. Vital Signs Last 24 Hrs  T(C): 36.7 (11 May 2021 07:18), Max: 36.9 (11 May 2021 00:39)  T(F): 98.1 (11 May 2021 07:18), Max: 98.4 (11 May 2021 00:39)  HR: 78 (11 May 2021 07:18) (78 - 98)  BP: 107/68 (11 May 2021 07:18) (100/66 - 113/83)  BP(mean): --  RR: 19 (11 May 2021 07:18) (18 - 19)  SpO2: 97% (11 May 2021 07:18) (97% - 100%)    PHYSICAL EXAM:  GENERAL: Sitting comfortable in bed, in no acute distress  HENMT: Atraumatic, moist mucous membranes, no oropharyngeal exudates or vesicles, uvula is midline EYES: Clear bilaterally, PERRL, EOMs intact b/l  HEART: RRR, S1/S2, no murmur/gallops/rubs  RESPIRATORY: Clear to auscultation bilaterally, no wheezes/rhonchi/rales  ABDOMEN: +BS, firm and distended, nontender, (+) shifting dullness on exam  EXTREMITIES: No lower extremity edema, +2 radial pulses b/l  NEURO:  A&Ox4, no focal motor deficits or sensory deficits   Heme/LYMPH: No ecchymosis or bruising, no anterior/posterior cervical or supraclavicular LAD  SKIN:  Skin normal color for race, warm, dry and intact. No evidence of rash.

## 2021-05-11 NOTE — H&P ADULT - NSHPREVIEWOFSYSTEMS_GEN_ALL_CORE
Gen: No fever, normal appetite  Eyes: No eye irritation or discharge  ENT: No ear pain, congestion, sore throat  Resp: No cough or trouble breathing  Cardiovascular: No chest pain or palpitation  Gastroenteric: (+) see HPI  :  No change in urine output; no dysuria  MS: No joint or muscle pain  Skin: No rashes  Neuro: No headache; no abnormal movements  Remainder negative, except as per the HPI
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
## 2021-05-11 NOTE — H&P ADULT - PROBLEM SELECTOR PLAN 3
h/o HTN, previously on lisinopril  -Pt on lasix/aldactone for EtOH cirrhosis since last admission  -ctm BPs

## 2021-05-11 NOTE — H&P ADULT - NSHPSOCIALHISTORY_GEN_ALL_CORE
Smoking 34 years, alcohol abuse history and stopped 4 months ago, no drug use. Smoking 34 years, alcohol abuse history and stopped 5 months ago, no drug use.

## 2021-05-11 NOTE — CONSULT NOTE ADULT - PROBLEM SELECTOR RECOMMENDATION 9
Pt. with YOVANI in the setting of diuretic, ascites, and ? HRS. Pt. recently hospitalized at [facility] from 4/9-4/16 for similar complaints and underwent paracentesis during hospitalization. Scr remained WNL during previous hospitalization. Scr was 1.14 on discharge (4/16). Scr on arrival to the ER was 1.48 yesterday. Previous US Abdomen negative for hydronephrosis (4/10). Pt. with likely hemodynamically mediated YOVANI ? HRS. Diuretics on hold for now. Check UA and urine electrolytes. Check US Kidney. Recommend Hepatology consult. Consider paracentesis. Monitor labs and urine output. Dose medications as per eGFR. Avoid potential nephrotoxins. Pt. with YOVANI in the setting of diuretic, ascites, and ? HRS. Pt. recently hospitalized at [facility] from 4/9-4/16 for similar complaints and underwent paracentesis during hospitalization. Scr remained WNL during previous hospitalization. Scr was 1.14 on discharge (4/16). Scr on arrival to the ER was 1.48 yesterday. Previous US Abdomen negative for hydronephrosis (4/10). Pt. with likely hemodynamically mediated YOVANI ? HRS. Hepatology consult note (5/11) reviewed. Diuretics on hold. Agree with IV Albumin and paracentesis. Check UA and urine electrolytes. Check US Kidney. Monitor labs and urine output. Dose medications as per eGFR. Avoid potential nephrotoxins.    If any questions, please feel free to contact me  Noe Meza   Nephrology Fellow  [phone]  (After 5 pm or on weekends please contact fellow on call) Pt. with YOVANI in the setting of cirrhosis and recent diuretic use. Pt. recently hospitalized at [facility] from 4/9-4/16 for similar complaints and underwent paracentesis during hospitalization. Scr remained WNL during previous hospitalization. Scr was 1.14 on discharge (4/16). Scr on arrival to the ER was 1.48 yesterday. Previous US Abdomen negative for hydronephrosis (4/10). Pt. with likely hemodynamically mediated YOVANI ? HRS. Hepatology consult note (5/11) reviewed. Diuretics on hold. Agree with IV Albumin. Paracentesis being planned. Check UA and urine electrolytes. Check US Kidney. Monitor labs and urine output. Dose medications as per eGFR. Avoid potential nephrotoxins.    If any questions, please feel free to contact me  Noe Meza   Nephrology Fellow  [phone]  (After 5 pm or on weekends please contact fellow on call)
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
## 2021-05-11 NOTE — H&P ADULT - PROBLEM SELECTOR PLAN 1
MELD-Na score 19 with 3-4% 3 month mortality risk. H/o recurrent ascites  - c/w Lasix 20 and Spirnolactone 50 mg, held for low BP, reassess and given if SBP >90   - fluid restrict  - Hep B and C serologies negative on last admission (4/21)  - CTAP with no liver masses last admission (4/21)  - hepatology eval for recs and OP f/u  - stool count, lactulose prn, assure daily BM  - ? paracentesis s/p platelets? MELD-Na score 19 with 3-4% 3 month mortality risk. H/o recurrent ascites  - c/w Lasix 20 and Spirnolactone 50 mg, held for low BP, reassess and given if SBP >90   - fluid restrict  - Hep B and C serologies negative on last admission (4/21)  - CTAP with no liver masses last admission (4/21)  - hepatology eval for recs and OP f/u  - stool count, lactulose prn, assure daily BM x2  - plan for therapeutic paracentesis

## 2021-05-11 NOTE — H&P ADULT - NSHPLABSRESULTS_GEN_ALL_CORE
.  LABS:                         10.4   4.06  )-----------( 37       ( 11 May 2021 00:23 )             32.1     05-11    133<L>  |  104  |  19  ----------------------------<  105<H>  5.0   |  19<L>  |  1.48<H>    Ca    8.7      11 May 2021 00:23    TPro  6.9  /  Alb  2.6<L>  /  TBili  0.6  /  DBili  x   /  AST  26  /  ALT  17  /  AlkPhos  83  05-11    PT/INR - ( 11 May 2021 00:23 )   PT: 14.4 sec;   INR: 1.28 ratio         PTT - ( 11 May 2021 00:23 )  PTT:30.7 sec          RADIOLOGY, EKG & ADDITIONAL TESTS: Reviewed.

## 2021-05-11 NOTE — H&P ADULT - PROBLEM SELECTOR PLAN 2
Pt with SCr of 1.48 on admission  -previous baseline 1.1-1.2  -ctm SCr and UOP Pt with SCr of 1.48 on admission  -concern for type 2 hepatorenal syndrome vs. i/s/o recent increase in spironolactone dose  -previous baseline 1.1-1.2  -ctm SCr and UOP  -f/u Urine lytes/creatinine, hold lasix/spironolactone ftm  -nephrology c/s

## 2021-05-11 NOTE — H&P ADULT - ATTENDING COMMENTS
decompensated alcoholic cirrhosis with refractory ascites  mild YOVANI - will sent urine studies and renal eval prior to LV para as HRS is of concern   hepatology eval - ? TIPS

## 2021-05-12 LAB
ALBUMIN SERPL ELPH-MCNC: 2.9 G/DL — LOW (ref 3.3–5)
ALP SERPL-CCNC: 73 U/L — SIGNIFICANT CHANGE UP (ref 40–120)
ALT FLD-CCNC: 14 U/L — SIGNIFICANT CHANGE UP (ref 4–41)
ANION GAP SERPL CALC-SCNC: 11 MMOL/L — SIGNIFICANT CHANGE UP (ref 7–14)
APTT BLD: 33.9 SEC — SIGNIFICANT CHANGE UP (ref 27–36.3)
AST SERPL-CCNC: 25 U/L — SIGNIFICANT CHANGE UP (ref 4–40)
BASOPHILS # BLD AUTO: 0.03 K/UL — SIGNIFICANT CHANGE UP (ref 0–0.2)
BASOPHILS NFR BLD AUTO: 0.8 % — SIGNIFICANT CHANGE UP (ref 0–2)
BILIRUB SERPL-MCNC: 0.5 MG/DL — SIGNIFICANT CHANGE UP (ref 0.2–1.2)
BUN SERPL-MCNC: 20 MG/DL — SIGNIFICANT CHANGE UP (ref 7–23)
CALCIUM SERPL-MCNC: 8.6 MG/DL — SIGNIFICANT CHANGE UP (ref 8.4–10.5)
CHLORIDE SERPL-SCNC: 106 MMOL/L — SIGNIFICANT CHANGE UP (ref 98–107)
CO2 SERPL-SCNC: 17 MMOL/L — LOW (ref 22–31)
COVID-19 SPIKE DOMAIN AB INTERP: POSITIVE
COVID-19 SPIKE DOMAIN ANTIBODY RESULT: 69.3 U/ML — HIGH
CREAT SERPL-MCNC: 1.45 MG/DL — HIGH (ref 0.5–1.3)
EOSINOPHIL # BLD AUTO: 0.05 K/UL — SIGNIFICANT CHANGE UP (ref 0–0.5)
EOSINOPHIL NFR BLD AUTO: 1.3 % — SIGNIFICANT CHANGE UP (ref 0–6)
GLUCOSE SERPL-MCNC: 104 MG/DL — HIGH (ref 70–99)
HCT VFR BLD CALC: 28.9 % — LOW (ref 39–50)
HGB BLD-MCNC: 9.6 G/DL — LOW (ref 13–17)
IANC: 1.75 K/UL — SIGNIFICANT CHANGE UP (ref 1.5–8.5)
IMM GRANULOCYTES NFR BLD AUTO: 0.3 % — SIGNIFICANT CHANGE UP (ref 0–1.5)
INR BLD: 1.26 RATIO — HIGH (ref 0.88–1.16)
LYMPHOCYTES # BLD AUTO: 1.54 K/UL — SIGNIFICANT CHANGE UP (ref 1–3.3)
LYMPHOCYTES # BLD AUTO: 40 % — SIGNIFICANT CHANGE UP (ref 13–44)
MAGNESIUM SERPL-MCNC: 1.5 MG/DL — LOW (ref 1.6–2.6)
MCHC RBC-ENTMCNC: 33.2 GM/DL — SIGNIFICANT CHANGE UP (ref 32–36)
MCHC RBC-ENTMCNC: 33.7 PG — SIGNIFICANT CHANGE UP (ref 27–34)
MCV RBC AUTO: 101.4 FL — HIGH (ref 80–100)
MONOCYTES # BLD AUTO: 0.47 K/UL — SIGNIFICANT CHANGE UP (ref 0–0.9)
MONOCYTES NFR BLD AUTO: 12.2 % — SIGNIFICANT CHANGE UP (ref 2–14)
NEUTROPHILS # BLD AUTO: 1.75 K/UL — LOW (ref 1.8–7.4)
NEUTROPHILS NFR BLD AUTO: 45.4 % — SIGNIFICANT CHANGE UP (ref 43–77)
NRBC # BLD: 0 /100 WBCS — SIGNIFICANT CHANGE UP
NRBC # FLD: 0 K/UL — SIGNIFICANT CHANGE UP
OSMOLALITY UR: 578 MOSM/KG — SIGNIFICANT CHANGE UP (ref 50–1200)
PHOSPHATE SERPL-MCNC: 3.6 MG/DL — SIGNIFICANT CHANGE UP (ref 2.5–4.5)
PLATELET # BLD AUTO: 30 K/UL — LOW (ref 150–400)
POTASSIUM SERPL-MCNC: 4.5 MMOL/L — SIGNIFICANT CHANGE UP (ref 3.5–5.3)
POTASSIUM SERPL-SCNC: 4.5 MMOL/L — SIGNIFICANT CHANGE UP (ref 3.5–5.3)
PROT SERPL-MCNC: 6.4 G/DL — SIGNIFICANT CHANGE UP (ref 6–8.3)
PROTHROM AB SERPL-ACNC: 14.2 SEC — HIGH (ref 10.6–13.6)
RBC # BLD: 2.85 M/UL — LOW (ref 4.2–5.8)
RBC # FLD: 13.2 % — SIGNIFICANT CHANGE UP (ref 10.3–14.5)
SARS-COV-2 IGG+IGM SERPL QL IA: 69.3 U/ML — HIGH
SARS-COV-2 IGG+IGM SERPL QL IA: POSITIVE
SODIUM SERPL-SCNC: 134 MMOL/L — LOW (ref 135–145)
WBC # BLD: 3.85 K/UL — SIGNIFICANT CHANGE UP (ref 3.8–10.5)
WBC # FLD AUTO: 3.85 K/UL — SIGNIFICANT CHANGE UP (ref 3.8–10.5)

## 2021-05-12 PROCEDURE — 99233 SBSQ HOSP IP/OBS HIGH 50: CPT | Mod: GC

## 2021-05-12 PROCEDURE — 99232 SBSQ HOSP IP/OBS MODERATE 35: CPT | Mod: GC

## 2021-05-12 RX ORDER — MAGNESIUM SULFATE 500 MG/ML
2 VIAL (ML) INJECTION ONCE
Refills: 0 | Status: COMPLETED | OUTPATIENT
Start: 2021-05-12 | End: 2021-05-12

## 2021-05-12 RX ADMIN — LACTULOSE 10 GRAM(S): 10 SOLUTION ORAL at 07:17

## 2021-05-12 RX ADMIN — Medication 50 GRAM(S): at 12:29

## 2021-05-12 RX ADMIN — Medication 50 MILLILITER(S): at 07:00

## 2021-05-12 RX ADMIN — Medication 50 MILLILITER(S): at 22:50

## 2021-05-12 RX ADMIN — Medication 50 MILLILITER(S): at 13:42

## 2021-05-12 RX ADMIN — Medication 325 MILLIGRAM(S): at 12:35

## 2021-05-12 RX ADMIN — Medication 100 MICROGRAM(S): at 07:16

## 2021-05-12 NOTE — PROGRESS NOTE ADULT - PROBLEM SELECTOR PLAN 1
MELD-Na score 19 with 3-4% 3 month mortality risk. H/o recurrent ascites  - c/w Lasix 20 and Spirnolactone 50 mg, held for low BP, reassess and given if SBP >90   - fluid restrict  - Hep B and C serologies negative on last admission (4/21)  - CTAP with no liver masses last admission (4/21)  - hepatology eval for recs and OP f/u  - stool count, lactulose prn, assure daily BM x2  - plan for therapeutic paracentesis today
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
## 2021-05-12 NOTE — CHART NOTE - NSCHARTNOTEFT_GEN_A_CORE
Ir consulted for therapeutic paracentesis. Will defer to procedure team as per protocol, if procedure team documents technical inability to perform procedure, reconsult IR for further evaluation at that time. If IR reconsulted will need repeat imaging to determine amount and location of ascites (limited ultrasound)
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
## 2021-05-12 NOTE — PROGRESS NOTE ADULT - PROBLEM SELECTOR PLAN 2
Pt with SCr of 1.48 on admission  -concern for type 2 hepatorenal syndrome vs. i/s/o recent increase in spironolactone dose  -previous baseline 1.1-1.2  -ctm SCr and UOP  -f/u Urine lytes/creatinine, hold lasix/spironolactone ftm  -nephrology c/s Pt with SCr of 1.48 on admission  -concern for type 2 hepatorenal syndrome vs. i/s/o recent increase in spironolactone dose  -previous baseline 1.1-1.2  -ctm SCr and UOP  -f/u Urine lytes/creatinine, hold lasix/spironolactone ftm  -nephrology--albumin 25 g q8h

## 2021-05-12 NOTE — PROGRESS NOTE ADULT - ATTENDING COMMENTS
renal fx the same on albumin  need renal input if ok to proceed with LV para - if so, will plan and send for cx as requested  pt reporting diarrhea - check C-diff, GI PCR, O&P  rest of infx w/u in process

## 2021-05-13 ENCOUNTER — TRANSCRIPTION ENCOUNTER (OUTPATIENT)
Age: 61
End: 2021-05-13

## 2021-05-13 VITALS
RESPIRATION RATE: 19 BRPM | DIASTOLIC BLOOD PRESSURE: 79 MMHG | HEART RATE: 90 BPM | TEMPERATURE: 98 F | OXYGEN SATURATION: 100 % | SYSTOLIC BLOOD PRESSURE: 112 MMHG

## 2021-05-13 LAB
ALBUMIN FLD-MCNC: 1.3 G/DL — SIGNIFICANT CHANGE UP
ALBUMIN SERPL ELPH-MCNC: 3.8 G/DL — SIGNIFICANT CHANGE UP (ref 3.3–5)
ALP SERPL-CCNC: 61 U/L — SIGNIFICANT CHANGE UP (ref 40–120)
ALT FLD-CCNC: 10 U/L — SIGNIFICANT CHANGE UP (ref 4–41)
ANION GAP SERPL CALC-SCNC: 10 MMOL/L — SIGNIFICANT CHANGE UP (ref 7–14)
APTT BLD: 35.1 SEC — SIGNIFICANT CHANGE UP (ref 27–36.3)
AST SERPL-CCNC: 23 U/L — SIGNIFICANT CHANGE UP (ref 4–40)
B PERT IGG+IGM PNL SER: ABNORMAL
BILIRUB SERPL-MCNC: 0.5 MG/DL — SIGNIFICANT CHANGE UP (ref 0.2–1.2)
BUN SERPL-MCNC: 18 MG/DL — SIGNIFICANT CHANGE UP (ref 7–23)
C DIFF BY PCR RESULT: SIGNIFICANT CHANGE UP
C DIFF TOX GENS STL QL NAA+PROBE: SIGNIFICANT CHANGE UP
CALCIUM SERPL-MCNC: 8.9 MG/DL — SIGNIFICANT CHANGE UP (ref 8.4–10.5)
CHLORIDE SERPL-SCNC: 104 MMOL/L — SIGNIFICANT CHANGE UP (ref 98–107)
CO2 SERPL-SCNC: 19 MMOL/L — LOW (ref 22–31)
COLOR FLD: YELLOW
CREAT ?TM UR-MCNC: 183 MG/DL — SIGNIFICANT CHANGE UP
CREAT SERPL-MCNC: 1.22 MG/DL — SIGNIFICANT CHANGE UP (ref 0.5–1.3)
CULTURE RESULTS: SIGNIFICANT CHANGE UP
EOSINOPHIL # FLD: 0 % — SIGNIFICANT CHANGE UP
FLUID INTAKE SUBSTANCE CLASS: SIGNIFICANT CHANGE UP
FLUID SEGMENTED GRANULOCYTES: 1 % — SIGNIFICANT CHANGE UP
FOLATE+VIT B12 SERBLD-IMP: 0 % — SIGNIFICANT CHANGE UP
GLUCOSE FLD-MCNC: 113 MG/DL — SIGNIFICANT CHANGE UP
GLUCOSE SERPL-MCNC: 97 MG/DL — SIGNIFICANT CHANGE UP (ref 70–99)
HCT VFR BLD CALC: 27.4 % — LOW (ref 39–50)
HGB BLD-MCNC: 8.7 G/DL — LOW (ref 13–17)
INR BLD: 1.36 RATIO — HIGH (ref 0.88–1.16)
LDH SERPL L TO P-CCNC: 83 U/L — SIGNIFICANT CHANGE UP
LKM AB SER-ACNC: <20.1 UNITS — SIGNIFICANT CHANGE UP (ref 0–20)
LYMPHOCYTES # FLD: 55 % — SIGNIFICANT CHANGE UP
MAGNESIUM SERPL-MCNC: 1.7 MG/DL — SIGNIFICANT CHANGE UP (ref 1.6–2.6)
MCHC RBC-ENTMCNC: 31.8 GM/DL — LOW (ref 32–36)
MCHC RBC-ENTMCNC: 33.1 PG — SIGNIFICANT CHANGE UP (ref 27–34)
MCV RBC AUTO: 104.2 FL — HIGH (ref 80–100)
MESOTHL CELL # FLD: 2 % — SIGNIFICANT CHANGE UP
MONOS+MACROS # FLD: 42 % — SIGNIFICANT CHANGE UP
NRBC # BLD: 0 /100 WBCS — SIGNIFICANT CHANGE UP
NRBC # FLD: 0 K/UL — SIGNIFICANT CHANGE UP
OTHER CELLS FLD MANUAL: 0 % — SIGNIFICANT CHANGE UP
PHOSPHATE SERPL-MCNC: 2.9 MG/DL — SIGNIFICANT CHANGE UP (ref 2.5–4.5)
PLATELET # BLD AUTO: 27 K/UL — LOW (ref 150–400)
POTASSIUM SERPL-MCNC: 3.9 MMOL/L — SIGNIFICANT CHANGE UP (ref 3.5–5.3)
POTASSIUM SERPL-SCNC: 3.9 MMOL/L — SIGNIFICANT CHANGE UP (ref 3.5–5.3)
PROT FLD-MCNC: 2.6 G/DL — SIGNIFICANT CHANGE UP
PROT SERPL-MCNC: 6.9 G/DL — SIGNIFICANT CHANGE UP (ref 6–8.3)
PROTHROM AB SERPL-ACNC: 15.4 SEC — HIGH (ref 10.6–13.6)
RBC # BLD: 2.63 M/UL — LOW (ref 4.2–5.8)
RBC # FLD: 13.2 % — SIGNIFICANT CHANGE UP (ref 10.3–14.5)
RCV VOL RI: 1000 CELLS/UL — HIGH (ref 0–5)
SODIUM SERPL-SCNC: 133 MMOL/L — LOW (ref 135–145)
SPECIMEN SOURCE: SIGNIFICANT CHANGE UP
TOTAL NUCLEATED CELL COUNT, BODY FLUID: 209 CELLS/UL — HIGH (ref 0–5)
TUBE TYPE: SIGNIFICANT CHANGE UP
UUN UR-MCNC: 883.3 MG/DL — SIGNIFICANT CHANGE UP
WBC # BLD: 3.25 K/UL — LOW (ref 3.8–10.5)
WBC # FLD AUTO: 3.25 K/UL — LOW (ref 3.8–10.5)

## 2021-05-13 PROCEDURE — 49083 ABD PARACENTESIS W/IMAGING: CPT | Mod: GC

## 2021-05-13 PROCEDURE — 99231 SBSQ HOSP IP/OBS SF/LOW 25: CPT | Mod: GC

## 2021-05-13 PROCEDURE — 99232 SBSQ HOSP IP/OBS MODERATE 35: CPT | Mod: GC

## 2021-05-13 PROCEDURE — 99239 HOSP IP/OBS DSCHRG MGMT >30: CPT | Mod: 25

## 2021-05-13 RX ORDER — FUROSEMIDE 40 MG
1 TABLET ORAL
Qty: 0 | Refills: 0 | DISCHARGE
Start: 2021-05-13

## 2021-05-13 RX ORDER — LIDOCAINE HCL 20 MG/ML
10 VIAL (ML) INJECTION ONCE
Refills: 0 | Status: COMPLETED | OUTPATIENT
Start: 2021-05-13 | End: 2021-05-13

## 2021-05-13 RX ORDER — FUROSEMIDE 40 MG
40 TABLET ORAL DAILY
Refills: 0 | Status: DISCONTINUED | OUTPATIENT
Start: 2021-05-13 | End: 2021-05-13

## 2021-05-13 RX ORDER — SPIRONOLACTONE 25 MG/1
100 TABLET, FILM COATED ORAL DAILY
Refills: 0 | Status: DISCONTINUED | OUTPATIENT
Start: 2021-05-13 | End: 2021-05-13

## 2021-05-13 RX ORDER — ALBUMIN HUMAN 25 %
350 VIAL (ML) INTRAVENOUS ONCE
Refills: 0 | Status: COMPLETED | OUTPATIENT
Start: 2021-05-13 | End: 2021-05-13

## 2021-05-13 RX ORDER — FUROSEMIDE 40 MG
1 TABLET ORAL
Qty: 30 | Refills: 0
Start: 2021-05-13 | End: 2021-06-11

## 2021-05-13 RX ORDER — SPIRONOLACTONE 25 MG/1
1 TABLET, FILM COATED ORAL
Qty: 30 | Refills: 0
Start: 2021-05-13 | End: 2021-06-11

## 2021-05-13 RX ORDER — SPIRONOLACTONE 25 MG/1
4 TABLET, FILM COATED ORAL
Qty: 0 | Refills: 0 | DISCHARGE
Start: 2021-05-13

## 2021-05-13 RX ADMIN — SPIRONOLACTONE 100 MILLIGRAM(S): 25 TABLET, FILM COATED ORAL at 17:08

## 2021-05-13 RX ADMIN — Medication 100 MICROGRAM(S): at 05:50

## 2021-05-13 RX ADMIN — Medication 40 MILLIGRAM(S): at 17:08

## 2021-05-13 RX ADMIN — Medication 25 MILLIGRAM(S): at 14:43

## 2021-05-13 RX ADMIN — Medication 10 MILLILITER(S): at 15:37

## 2021-05-13 RX ADMIN — Medication 175 MILLILITER(S): at 17:08

## 2021-05-13 RX ADMIN — Medication 50 MILLILITER(S): at 05:50
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
## 2021-05-13 NOTE — PROGRESS NOTE ADULT - SUBJECTIVE AND OBJECTIVE BOX
Chief Complaint:  Patient is a 60y old  Male who presents with a chief complaint of abdominal distension (12 May 2021 07:55)      Interval Events: Pt awaiting paracentesis. Pt denies nausea, vomiting, abd pain.   ROS: All 12 point system except listed above were otherwise negative.    Allergies:  No Known Allergies        Hospital Medications:  albumin human 25% IVPB 100 milliLiter(s) IV Intermittent every 8 hours  diphenhydrAMINE 25 milliGRAM(s) Oral every 6 hours PRN  ferrous    sulfate 325 milliGRAM(s) Oral daily  lactulose Syrup 10 Gram(s) Oral two times a day  levothyroxine 100 MICROGram(s) Oral daily      PMHX/PSHX:  Cirrhosis    Hypertension    CKD (chronic kidney disease)    S/P craniotomy        Family history:  No pertinent family history in first degree relatives      There is no family history of peptic ulcer disease, gastric cancer, colon polyps, colon cancer, celiac disease, biliary, hepatic, or pancreatic disease.  None of the female relatives have breast, uterine, or ovarian cancer.     PHYSICAL EXAM:   Vital Signs:  Vital Signs Last 24 Hrs  T(C): 36.9 (12 May 2021 05:34), Max: 37.1 (11 May 2021 22:46)  T(F): 98.4 (12 May 2021 05:34), Max: 98.7 (11 May 2021 22:46)  HR: 82 (12 May 2021 05:34) (80 - 83)  BP: 109/80 (12 May 2021 05:34) (98/73 - 109/80)  BP(mean): --  RR: 18 (12 May 2021 05:34) (18 - 18)  SpO2: 100% (12 May 2021 05:34) (100% - 100%)  Daily     Daily     ROS:     General:  No wt loss, fevers, chills, night sweats, fatigue,   Eyes:  Good vision, no reported pain  ENT:  No sore throat, pain, runny nose, dysphagia  CV:  No pain, palpitations, hypo/hypertension  Resp:  No dyspnea, cough, tachypnea, wheezing  GI: See HPI   :  No pain, bleeding, incontinence, nocturia  Muscle:  No pain, weakness  Neuro:  No weakness, tingling, memory problems  Psych:  No fatigue, insomnia, mood problems, depression  Endocrine:  No polyuria, polydipsia, cold/heat intolerance  Heme:  No petechiae, ecchymosis, easy bruisability  Skin:  No rash, tattoos, scars, edema      PHYSICAL EXAM:     GENERAL:  Appears stated age, well-groomed  HEENT:  NC/AT,  conjunctivae clear and pink  CHEST:  Full & symmetric excursion, no increased effort, breath sounds clear  HEART:  Regular rhythm, S1, S2, no murmur/rub/S3/S4, no abdominal bruit, no edema  ABDOMEN:  Tense, non-tender, distended with fluid wave, normoactive bowel sounds  EXTEREMITIES:  no cyanosis,clubbing or edema  SKIN:  No rash/erythema/ecchymoses  NEURO:  Alert, oriented, no asterixis    LABS:                        9.6    3.85  )-----------( 30       ( 12 May 2021 07:40 )             28.9     Mean Cell Volume: 101.4 fL (- @ 07:40)    05-12    134<L>  |  106  |  20  ----------------------------<  104<H>  4.5   |  17<L>  |  1.45<H>    Ca    8.6      12 May 2021 07:40  Phos  3.6     -  Mg     1.5     -    TPro  6.4  /  Alb  2.9<L>  /  TBili  0.5  /  DBili  x   /  AST  25  /  ALT  14  /  AlkPhos  73  05-12    LIVER FUNCTIONS - ( 12 May 2021 07:40 )  Alb: 2.9 g/dL / Pro: 6.4 g/dL / ALK PHOS: 73 U/L / ALT: 14 U/L / AST: 25 U/L / GGT: x           PT/INR - ( 12 May 2021 07:40 )   PT: 14.2 sec;   INR: 1.26 ratio         PTT - ( 12 May 2021 07:40 )  PTT:33.9 sec  Urinalysis Basic - ( 11 May 2021 16:35 )    Color: Yellow / Appearance: Clear / S.020 / pH: x  Gluc: x / Ketone: Negative  / Bili: Negative / Urobili: <2 mg/dL   Blood: x / Protein: Negative / Nitrite: Negative   Leuk Esterase: Negative / RBC: x / WBC x   Sq Epi: x / Non Sq Epi: x / Bacteria: x                              9.6    3.85  )-----------( 30       ( 12 May 2021 07:40 )             28.9                         10.4   4.06  )-----------( 37       ( 11 May 2021 00:23 )             32.1     Imaging:          
[facility] DIVISION OF KIDNEY DISEASES AND HYPERTENSION -- FOLLOW UP NOTE  --------------------------------------------------------------------------------  HPI: 60-year-old male with liver cirrhosis, HTN, hypothyroidism was hospitalized at [facility] on 5/10 for abdominal distension. Pt. says he noted increasing abdominal distension so he came to [facility] to get it drained. Nephrology team consulted for YOVANI. Pt. was recently hospitalized at [facility] from 4/9-4/14 for similar complaints. Upon lab review on [facility], Scr was 1.19 on 4/9. Scr remained WNL and was 1.14 on discharge (4/14). On arrival to ER, Scr was 1.48 (5/10). Diuretics held and pt. started on IV Albumin. Scr today improved to 1.22.     Pt. evaluated at bedside, in no acute distress. Pt. awaiting paracentesis.    PAST HISTORY  --------------------------------------------------------------------------------  No significant changes to PMH, PSH, FHx, SHx, unless otherwise noted    ALLERGIES & MEDICATIONS  --------------------------------------------------------------------------------  Allergies    No Known Allergies    Intolerances    Standing Inpatient Medications  albumin human 25% IVPB 100 milliLiter(s) IV Intermittent every 8 hours  levothyroxine 100 MICROGram(s) Oral daily  lidocaine 1% Injectable 10 milliLiter(s) Local Injection once    REVIEW OF SYSTEMS  --------------------------------------------------------------------------------  Gen: no weakness  Respiratory: No dyspnea  CV: No chest pain  GI: see HPI  MSK: no LE edema  Neuro: No dizziness  Heme: No bleeding    All other systems were reviewed and are negative, except as noted.    VITALS/PHYSICAL EXAM  --------------------------------------------------------------------------------  T(C): 36.7 (05-13-21 @ 05:48), Max: 37.1 (05-12-21 @ 22:57)  HR: 80 (05-13-21 @ 05:48) (79 - 102)  BP: 104/73 (05-13-21 @ 05:48) (94/69 - 128/65)  RR: 16 (05-13-21 @ 05:48) (16 - 18)  SpO2: 95% (05-13-21 @ 05:48) (95% - 100%)  Wt(kg): --    05-12-21 @ 07:01  -  05-13-21 @ 07:00  --------------------------------------------------------  IN: 0 mL / OUT: 100 mL / NET: -100 mL    Physical Exam:  	Gen: NAD  	HEENT: MMM  	Pulm: good air entry B/L  	CV: S1S2  	Abd: + distended, ascites +  	Ext: No LE edema B/L  	Neuro: Awake  	Skin: Warm and dry    LABS/STUDIES  --------------------------------------------------------------------------------              8.7    3.25  >-----------<  27       [05-13-21 @ 08:33]              27.4     133  |  104  |  18  ----------------------------<  97      [05-13-21 @ 08:33]  3.9   |  19  |  1.22        Ca     8.9     [05-13-21 @ 08:33]      Mg     1.7     [05-13-21 @ 08:33]      Phos  2.9     [05-13-21 @ 08:33]    Creatinine Trend:  SCr 1.22 [05-13 @ 08:33]  SCr 1.45 [05-12 @ 07:40]  SCr 1.48 [05-11 @ 00:23]  SCr 1.14 [04-14 @ 06:28]
  Chief Complaint:  Patient is a 60y old  Male who presents with a chief complaint of abdominal distension (13 May 2021 10:37)      Interval Events: Still waiting paracentesis. Pt denies nausea, vomiting, abd pain.   ROS: All 12 point system except listed above were otherwise negative.    Allergies:  No Known Allergies        Hospital Medications:  albumin human 25% IVPB 100 milliLiter(s) IV Intermittent every 8 hours  diphenhydrAMINE 25 milliGRAM(s) Oral every 6 hours PRN  levothyroxine 100 MICROGram(s) Oral daily  lidocaine 1% Injectable 10 milliLiter(s) Local Injection once      PMHX/PSHX:  Cirrhosis    Hypertension    CKD (chronic kidney disease)    S/P craniotomy        Family history:  No pertinent family history in first degree relatives      There is no family history of peptic ulcer disease, gastric cancer, colon polyps, colon cancer, celiac disease, biliary, hepatic, or pancreatic disease.  None of the female relatives have breast, uterine, or ovarian cancer.     PHYSICAL EXAM:   Vital Signs:  Vital Signs Last 24 Hrs  T(C): 36.7 (13 May 2021 05:48), Max: 37.1 (12 May 2021 22:57)  T(F): 98 (13 May 2021 05:48), Max: 98.8 (12 May 2021 22:57)  HR: 80 (13 May 2021 05:48) (79 - 102)  BP: 104/73 (13 May 2021 05:48) (94/69 - 128/65)  BP(mean): --  RR: 16 (13 May 2021 05:48) (16 - 18)  SpO2: 95% (13 May 2021 05:48) (95% - 100%)  Daily     Daily     ROS:     General:  No wt loss, fevers, chills, night sweats, fatigue,   Eyes:  Good vision, no reported pain  ENT:  No sore throat, pain, runny nose, dysphagia  CV:  No pain, palpitations, hypo/hypertension  Resp:  No dyspnea, cough, tachypnea, wheezing  GI: See HPI   :  No pain, bleeding, incontinence, nocturia  Muscle:  No pain, weakness  Neuro:  No weakness, tingling, memory problems  Psych:  No fatigue, insomnia, mood problems, depression  Endocrine:  No polyuria, polydipsia, cold/heat intolerance  Heme:  No petechiae, ecchymosis, easy bruisability  Skin:  No rash, tattoos, scars, edema      PHYSICAL EXAM:     GENERAL:  Appears stated age, well-groomed  HEENT:  NC/AT,  conjunctivae clear and pink  CHEST:  Full & symmetric excursion, no increased effort, breath sounds clear  HEART:  Regular rhythm, S1, S2, no murmur/rub/S3/S4, no abdominal bruit, no edema  ABDOMEN:  Tense, non-tender, distended with fluid wave, normoactive bowel sounds  EXTEREMITIES:  no cyanosis,clubbing or edema  SKIN:  No rash/erythema/ecchymoses  NEURO:  Alert, oriented, no asterixis      LABS:                        8.7    3.25  )-----------( 27       ( 13 May 2021 08:33 )             27.4     Mean Cell Volume: 104.2 fL (-21 @ 08:33)    -    133<L>  |  104  |  18  ----------------------------<  97  3.9   |  19<L>  |  1.22    Ca    8.9      13 May 2021 08:33  Phos  2.9     -  Mg     1.7     -    TPro  6.9  /  Alb  3.8  /  TBili  0.5  /  DBili  x   /  AST  23  /  ALT  10  /  AlkPhos  61  05-13    LIVER FUNCTIONS - ( 13 May 2021 08:33 )  Alb: 3.8 g/dL / Pro: 6.9 g/dL / ALK PHOS: 61 U/L / ALT: 10 U/L / AST: 23 U/L / GGT: x           PT/INR - ( 13 May 2021 08:33 )   PT: 15.4 sec;   INR: 1.36 ratio         PTT - ( 13 May 2021 08:33 )  PTT:35.1 sec  Urinalysis Basic - ( 11 May 2021 16:35 )    Color: Yellow / Appearance: Clear / S.020 / pH: x  Gluc: x / Ketone: Negative  / Bili: Negative / Urobili: <2 mg/dL   Blood: x / Protein: Negative / Nitrite: Negative   Leuk Esterase: Negative / RBC: x / WBC x   Sq Epi: x / Non Sq Epi: x / Bacteria: x                              8.7    3.25  )-----------(        ( 13 May 2021 08:33 )             27.4                         9.6    3.85  )-----------( 30       ( 12 May 2021 07:40 )             28.9                         10.4   4.06  )-----------( 37       ( 11 May 2021 00:23 )             32.1     Imaging:          
Progress Note    YAMILE REDDY 60y (1960) Male [id number]  21 (1d)    Dr. Ivan Liang, [facility] PGY1  Pager# [phone]    Chief Complaint: abdominal distension    Subjective:  No acute events overnight. Patient seen and examined at bedside.    PAST MEDICAL & SURGICAL HISTORY:  Cirrhosis [K74.60]    Hypertension [I10]    CKD (chronic kidney disease) [N18.9]    S/P craniotomy [Z98.890]      albumin human 25% IVPB 100 milliLiter(s) IV Intermittent every 8 hours  diphenhydrAMINE 25 milliGRAM(s) Oral every 6 hours PRN  ferrous    sulfate 325 milliGRAM(s) Oral daily  lactulose Syrup 10 Gram(s) Oral two times a day  levothyroxine 100 MICROGram(s) Oral daily    Objective:  T(C): 36.9 (21 @ 05:34), Max: 37.1 (21 @ 22:46)  HR: 82 (21 @ 05:34) (80 - 95)  BP: 109/80 (21 @ 05:34) (98/73 - 119/79)  RR: 18 (21 @ 05:34) (18 - 18)  SpO2: 100% (21 @ 05:34) (100% - 100%)    PHYSICAL EXAM:  GENERAL: Sitting comfortable in bed, in no acute distress  HENMT: Atraumatic, moist mucous membranes, no oropharyngeal exudates or vesicles, uvula is midline EYES: Clear bilaterally, PERRL, EOMs intact b/l  HEART: RRR, S1/S2, no murmur/gallops/rubs  RESPIRATORY: Clear to auscultation bilaterally, no wheezes/rhonchi/rales  ABDOMEN: +BS, firm and distended, nontender, (+) shifting dullness on exam  EXTREMITIES: No lower extremity edema, +2 radial pulses b/l  NEURO:  A&Ox4, no focal motor deficits or sensory deficits   Heme/LYMPH: No ecchymosis or bruising, no anterior/posterior cervical or supraclavicular LAD  SKIN:  Skin normal color for race, warm, dry and intact. No evidence of rash.    CAPILLARY BLOOD GLUCOSE    ( @ 07:40)                      9.6  3.85 )-----------( 30                 28.9    Neutrophils = 1.75 (45.4%)  Lymphocytes = 1.54 (40.0%)  Eosinophils = 0.05 (1.3%)  Basophils = 0.03 (0.8%)  Monocytes = 0.47 (12.2%)  Bands = --%    05-11    133<L>  |  104  |  19  ----------------------------<  105<H>  5.0   |  19<L>  |  1.48<H>    Ca    8.7      11 May 2021 00:23    TPro  6.9  /  Alb  2.6<L>  /  TBili  0.6  /  DBili  x   /  AST  26  /  ALT  17  /  AlkPhos  83  05-11    ( 11 May 2021 00:23 )   PT: 14.4 sec;   INR: 1.28 ratio;       PTT:30.7 sec    Urinalysis Basic - ( 11 May 2021 16:35 )    Color: Yellow / Appearance: Clear / S.020 / pH: x  Gluc: x / Ketone: Negative  / Bili: Negative / Urobili: <2 mg/dL   Blood: x / Protein: Negative / Nitrite: Negative   Leuk Esterase: Negative / RBC: x / WBC x   Sq Epi: x / Non Sq Epi: x / Bacteria: x    WBC Trend: 3.85<--, 4.06<--    Hb Trend: 9.6<--, 10.4<--  
Progress Note    YAMILE REDDY 60y (1960) Male [id number]  21 (2d)    Dr. Ivan Liang, [facility] PGY1  Pager# [phone]    Chief Complaint: abdominal distension    Subjective:  No acute events overnight. Patient seen and examined at bedside. No new complaints    PAST MEDICAL & SURGICAL HISTORY:  Cirrhosis [K74.60]    Hypertension [I10]    CKD (chronic kidney disease) [N18.9]    S/P craniotomy [Z98.890]      albumin human 25% IVPB 100 milliLiter(s) IV Intermittent every 8 hours  diphenhydrAMINE 25 milliGRAM(s) Oral every 6 hours PRN  levothyroxine 100 MICROGram(s) Oral daily    Objective:  T(C): 36.7 (21 @ 05:48), Max: 37.1 (21 @ 22:57)  HR: 80 (21 @ 05:48) (79 - 102)  BP: 104/73 (21 @ 05:48) (94/69 - 128/65)  RR: 16 (21 @ 05:48) (16 - 18)  SpO2: 95% (21 @ 05:48) (95% - 100%)    PHYSICAL EXAM:  GENERAL: Sitting comfortable in bed, in no acute distress  HENMT: Atraumatic, moist mucous membranes, no oropharyngeal exudates or vesicles, uvula is midline EYES: Clear bilaterally, PERRL, EOMs intact b/l  HEART: RRR, S1/S2, no murmur/gallops/rubs  RESPIRATORY: Clear to auscultation bilaterally, no wheezes/rhonchi/rales  ABDOMEN: +BS, firm and distended, nontender, (+) shifting dullness on exam  EXTREMITIES: No lower extremity edema, +2 radial pulses b/l  NEURO:  A&Ox4, no focal motor deficits or sensory deficits   Heme/LYMPH: No ecchymosis or bruising, no anterior/posterior cervical or supraclavicular LAD  SKIN:  Skin normal color for race, warm, dry and intact. No evidence of rash.    21 @ 07:01  -  21 @ 07:00  --------------------------------------------------------  IN: 0 mL / OUT: 100 mL / NET: -100 mL        CAPILLARY BLOOD GLUCOSE      ( @ 07:40)                      9.6  3.85 )-----------( 30                 28.9    Neutrophils = 1.75 (45.4%)  Lymphocytes = 1.54 (40.0%)  Eosinophils = 0.05 (1.3%)  Basophils = 0.03 (0.8%)  Monocytes = 0.47 (12.2%)  Bands = --%        134<L>  |  106  |  20  ----------------------------<  104<H>  4.5   |  17<L>  |  1.45<H>    Ca    8.6      12 May 2021 07:40  Phos  3.6       Mg     1.5         TPro  6.4  /  Alb  2.9<L>  /  TBili  0.5  /  DBili  x   /  AST  25  /  ALT  14  /  AlkPhos  73      ( 12 May 2021 07:40 )   PT: 14.2 sec;   INR: 1.26 ratio;       PTT:33.9 sec    Urinalysis Basic - ( 11 May 2021 16:35 )    Color: Yellow / Appearance: Clear / S.020 / pH: x  Gluc: x / Ketone: Negative  / Bili: Negative / Urobili: <2 mg/dL   Blood: x / Protein: Negative / Nitrite: Negative   Leuk Esterase: Negative / RBC: x / WBC x   Sq Epi: x / Non Sq Epi: x / Bacteria: x        WBC Trend: 3.85<--, 4.06<--    Hb Trend: 9.6<--, 10.4<--

## 2021-05-13 NOTE — PROGRESS NOTE ADULT - PROBLEM SELECTOR PLAN 3
h/o HTN, previously on lisinopril  -Pt on lasix/aldactone for EtOH cirrhosis since last admission  -ctm BPs
h/o HTN, previously on lisinopril  -Pt on lasix/aldactone for EtOH cirrhosis since last admission  -ctm BPs

## 2021-05-13 NOTE — PROGRESS NOTE ADULT - PROBLEM SELECTOR PLAN 1
Pt. with YOVANI in the setting of cirrhosis and recent diuretic use. Pt. recently hospitalized at [facility] from 4/9-4/16 for similar complaints and underwent paracentesis during hospitalization. Scr remained WNL during previous hospitalization. Scr was 1.14 on discharge (4/16). Scr on arrival to the ER was 1.48. Scr today improved to 1.22 today. UA negative for hematuria/proteinuria. Spot urine TP/Cr WNL (0.1). US Kidney negative for hydronephrosis (5/11). Pt. with hemodynamically mediated YOVANI, now resolved. Hepatology progress note (5/12) reviewed. Diuretics on hold. Pt. on IV Albumin. Pt. awaiting paracentesis. Monitor labs and urine output. Dose medications as per eGFR. Avoid potential nephrotoxins.    If any questions, please feel free to contact me  Noe Meza   Nephrology Fellow  [phone]  (After 5 pm or on weekends please contact fellow on call) Pt. with YOVANI in the setting of cirrhosis and recent diuretic use. Pt. recently hospitalized at [facility] from 4/9-4/16 for similar complaints and underwent paracentesis during hospitalization. Scr remained WNL during previous hospitalization. Scr was 1.14 on discharge (4/16). Scr on arrival to the ER was 1.48. Scr today improved to 1.22 today. UA negative for hematuria/proteinuria. Spot urine TP/CR WNL (0.1). US Kidney negative for hydronephrosis (5/11). Pt. with hemodynamically mediated YOVANI, now resolving. Hepatology progress note (5/12) reviewed. Diuretics on hold. Pt. on IV Albumin. Pt. awaiting paracentesis. Monitor labs and urine output. Dose medications as per eGFR. Avoid potential nephrotoxins.    Will discontinue follow-up. Reconsult as needed.    If any questions, please feel free to contact me  Noe Meza   Nephrology Fellow  [phone]  (After 5 pm or on weekends please contact fellow on call)

## 2021-05-13 NOTE — PROGRESS NOTE ADULT - REASON FOR ADMISSION
abdominal distension

## 2021-05-13 NOTE — DISCHARGE NOTE PROVIDER - NSDCMRMEDTOKEN_GEN_ALL_CORE_FT
Aldactone 100 mg oral tablet: 1 tab(s) orally once a day   ferrous sulfate 324 mg (65 mg elemental iron) oral tablet: 1 tab(s) orally once a day  furosemide 40 mg oral tablet: 1 tab(s) orally once a day  levothyroxine 100 mcg (0.1 mg) oral tablet: 1 tab(s) orally once a day

## 2021-05-13 NOTE — DISCHARGE NOTE NURSING/CASE MANAGEMENT/SOCIAL WORK - PATIENT PORTAL LINK FT
You can access the FollowMyHealth Patient Portal offered by [facility] by registering at the following website: [email or web address]. By joining [facility]’s FollowMyHealth portal, you will also be able to view your health information using other applications (apps) compatible with our system.

## 2021-05-13 NOTE — DISCHARGE NOTE NURSING/CASE MANAGEMENT/SOCIAL WORK - NSDCPEEMAIL_GEN_ALL_CORE
[facility] for Tobacco Control email [email or web address]

## 2021-05-13 NOTE — PROCEDURE NOTE - NSPROCDETAILS_GEN_ALL_CORE
US guidance to identify site, pocket >10cm on US, NO visible vasculature on doppler/location identified, sterile technique used, catheter introduced, fluid drained/sterile dressing applied

## 2021-05-13 NOTE — DISCHARGE NOTE PROVIDER - NSRESEARCHGRANT_MLMHIDDEN_GEN_A_CORE
Emergency Department Nursing Plan of Care       The Nursing Plan of Care is developed from the Nursing assessment and Emergency Department Attending provider initial evaluation. The plan of care may be reviewed in the ED Provider note.     The Plan of Care was developed with the following considerations:   Patient / Family readiness to learn indicated by:verbalized understanding  Persons(s) to be included in education: patient  Barriers to Learning/Limitations:No    Signed     Elkin Lau    5/26/2019   8:12 AM yes

## 2021-05-13 NOTE — DISCHARGE NOTE PROVIDER - NSDCCPTREATMENT_GEN_ALL_CORE_FT
PRINCIPAL PROCEDURE  Procedure: Paracentesis at bedside  Findings and Treatment: While hospitalized you received a paracentesis, or drainage of the fluid from your abdomen. You tolerated the procedure well and had no major complications.

## 2021-05-13 NOTE — DISCHARGE NOTE PROVIDER - PROVIDER TOKENS
PROVIDER:[TOKEN:[[id number]],FOLLOWUP:[1 week]],PROVIDER:[TOKEN:[[id number]],FOLLOWUP:[1-3 days],ESTABLISHEDPATIENT:[T]]
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
## 2021-05-13 NOTE — PROGRESS NOTE ADULT - PROBLEM SELECTOR PLAN 2
Pt with SCr of 1.48 on admission  -concern for type 2 hepatorenal syndrome vs. i/s/o recent increase in spironolactone dose  -previous baseline 1.1-1.2  -ctm SCr and UOP  -f/u Urine lytes/creatinine, hold lasix/spironolactone ftm  -nephrology--albumin 25 g q8h

## 2021-05-13 NOTE — DISCHARGE NOTE NURSING/CASE MANAGEMENT/SOCIAL WORK - NSDCPEWEB_GEN_ALL_CORE
[facility] for Tobacco Control website --- [email or web address] --- [email or web address]

## 2021-05-13 NOTE — PROGRESS NOTE ADULT - PROBLEM SELECTOR PLAN 4
No pharmacologic ppx for now, given thrombocytopenia and IVC filter
No pharmacologic ppx for now, given thrombocytopenia and IVC filter

## 2021-05-13 NOTE — PROGRESS NOTE ADULT - ATTENDING COMMENTS
Pt. with YOVANI in setting of cirrhosis and recent diuretic use. Scr decreased to 1.22 today. Monitor labs and urine output. Avoid any potential nephrotoxins. Dose medications as per eGFR.     Discussed with patient and primary medical team.

## 2021-05-13 NOTE — PROGRESS NOTE ADULT - ATTENDING COMMENTS
resolved YOVANI  DW renal - no objection to LV para, planned for today with alb infusion after 2 units plt for thrombocytopenia - suspect cirrhotic related - chronic   if cell count neg for SBP, dc planning - diarrhea much improved - doubt infectious

## 2021-05-13 NOTE — DISCHARGE NOTE PROVIDER - CARE PROVIDER_API CALL
Saniya [address], [address]  [place] [address]  Phone: [phone]  Fax: [phone]  Follow Up Time: 1 week    LEV[place]HAL, [place] [address]  Phone: [phone]  Fax: [phone]  Established Patient  Follow Up Time: 1-3 days

## 2021-05-13 NOTE — DISCHARGE NOTE PROVIDER - NSDCCPCAREPLAN_GEN_ALL_CORE_FT
PRINCIPAL DISCHARGE DIAGNOSIS  Diagnosis: Abdominal distension  Assessment and Plan of Treatment: You were admitted to the hospital for worsening abdominal distension and ascites (build up of fluid in your abdominal compartment) as a result of your underlying liver disease. You received albumin infusion and underwent a procedure to remove the fluid from your abdomen (paracentesis) which improved your symptoms.   Please continue to take your furosemide (now 40 mg daily) and your spironolactone (100 mg daily) and follow up with your primary care physician, hepatologist (liver doctor), and your nephrologist (kidney doctor) at your previously scheduled appointment on the 21st of this month.   If you feel yourself becoming confused, please start taking your lactulose again until you have 2-3 bowel movements daily as it will help with altered mental status from your liver disease.

## 2021-05-13 NOTE — PROGRESS NOTE ADULT - PROBLEM SELECTOR PLAN 1
MELD-Na score 19 with 3-4% 3 month mortality risk. H/o recurrent ascites  - Lasix 20 and Spironolactone 50 mg, held for now  - fluid restrict  - Hep B and C serologies negative on last admission (4/21)  - CTAP with no liver masses last admission (4/21)  - hepatology eval for recs and OP f/u  - plan for therapeutic paracentesis today
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
## 2021-05-14 LAB
GRAM STN FLD: SIGNIFICANT CHANGE UP
SPECIMEN SOURCE: SIGNIFICANT CHANGE UP

## 2021-05-17 LAB
CULTURE RESULTS: SIGNIFICANT CHANGE UP
SPECIMEN SOURCE: SIGNIFICANT CHANGE UP

## 2021-05-18 LAB
CULTURE RESULTS: SIGNIFICANT CHANGE UP
SPECIMEN SOURCE: SIGNIFICANT CHANGE UP

## 2021-06-21 ENCOUNTER — INPATIENT (INPATIENT)
Facility: HOSPITAL | Age: 61
LOS: 2 days | Discharge: HOME | End: 2021-06-24
Attending: HOSPITALIST | Admitting: HOSPITALIST
Payer: MEDICARE

## 2021-06-21 VITALS
HEIGHT: 66 IN | HEART RATE: 78 BPM | SYSTOLIC BLOOD PRESSURE: 99 MMHG | OXYGEN SATURATION: 100 % | TEMPERATURE: 98 F | RESPIRATION RATE: 18 BRPM | DIASTOLIC BLOOD PRESSURE: 67 MMHG

## 2021-06-21 DIAGNOSIS — Z98.89 OTHER SPECIFIED POSTPROCEDURAL STATES: Chronic | ICD-10-CM

## 2021-06-21 DIAGNOSIS — S72.90XA UNSPECIFIED FRACTURE OF UNSPECIFIED FEMUR, INITIAL ENCOUNTER FOR CLOSED FRACTURE: Chronic | ICD-10-CM

## 2021-06-21 DIAGNOSIS — S02.10XA UNSPECIFIED FRACTURE OF BASE OF SKULL, INITIAL ENCOUNTER FOR CLOSED FRACTURE: Chronic | ICD-10-CM

## 2021-06-21 DIAGNOSIS — K70.31 ALCOHOLIC CIRRHOSIS OF LIVER WITH ASCITES: ICD-10-CM

## 2021-06-21 DIAGNOSIS — Z98.890 OTHER SPECIFIED POSTPROCEDURAL STATES: Chronic | ICD-10-CM

## 2021-06-21 LAB
ALBUMIN SERPL ELPH-MCNC: 3.8 G/DL — SIGNIFICANT CHANGE UP (ref 3.3–5)
ALP SERPL-CCNC: 99 U/L — SIGNIFICANT CHANGE UP (ref 40–120)
ALT FLD-CCNC: 22 U/L — SIGNIFICANT CHANGE UP (ref 4–41)
ANION GAP SERPL CALC-SCNC: 13 MMOL/L — SIGNIFICANT CHANGE UP (ref 7–14)
APTT BLD: 37.1 SEC — HIGH (ref 27–36.3)
AST SERPL-CCNC: 27 U/L — SIGNIFICANT CHANGE UP (ref 4–40)
BASE EXCESS BLDV CALC-SCNC: -10.6 MMOL/L — LOW (ref -3–2)
BASOPHILS # BLD AUTO: 0.02 K/UL — SIGNIFICANT CHANGE UP (ref 0–0.2)
BASOPHILS NFR BLD AUTO: 0.5 % — SIGNIFICANT CHANGE UP (ref 0–2)
BILIRUB SERPL-MCNC: 0.6 MG/DL — SIGNIFICANT CHANGE UP (ref 0.2–1.2)
BLOOD GAS VENOUS - CREATININE: SIGNIFICANT CHANGE UP MG/DL (ref 0.5–1.3)
BLOOD GAS VENOUS COMPREHENSIVE RESULT: SIGNIFICANT CHANGE UP
BUN SERPL-MCNC: 20 MG/DL — SIGNIFICANT CHANGE UP (ref 7–23)
CALCIUM SERPL-MCNC: 9.5 MG/DL — SIGNIFICANT CHANGE UP (ref 8.4–10.5)
CHLORIDE BLDV-SCNC: 108 MMOL/L — SIGNIFICANT CHANGE UP (ref 96–108)
CHLORIDE SERPL-SCNC: 104 MMOL/L — SIGNIFICANT CHANGE UP (ref 98–107)
CO2 SERPL-SCNC: 13 MMOL/L — LOW (ref 22–31)
CREAT SERPL-MCNC: 1.72 MG/DL — HIGH (ref 0.5–1.3)
EOSINOPHIL # BLD AUTO: 0.03 K/UL — SIGNIFICANT CHANGE UP (ref 0–0.5)
EOSINOPHIL NFR BLD AUTO: 0.7 % — SIGNIFICANT CHANGE UP (ref 0–6)
GAS PNL BLDV: 129 MMOL/L — LOW (ref 136–146)
GLUCOSE BLDC GLUCOMTR-MCNC: 168 MG/DL — HIGH (ref 70–99)
GLUCOSE BLDV-MCNC: 267 MG/DL — HIGH (ref 70–99)
GLUCOSE SERPL-MCNC: 109 MG/DL — HIGH (ref 70–99)
HCO3 BLDV-SCNC: 15 MMOL/L — LOW (ref 20–27)
HCT VFR BLD CALC: 37.3 % — LOW (ref 39–50)
HCT VFR BLDA CALC: 31.5 % — LOW (ref 39–51)
HGB BLD CALC-MCNC: 10.2 G/DL — LOW (ref 13–17)
HGB BLD-MCNC: 11.7 G/DL — LOW (ref 13–17)
IANC: 2.19 K/UL — SIGNIFICANT CHANGE UP (ref 1.5–8.5)
IMM GRANULOCYTES NFR BLD AUTO: 0.2 % — SIGNIFICANT CHANGE UP (ref 0–1.5)
INR BLD: 1.2 RATIO — HIGH (ref 0.88–1.16)
LACTATE BLDV-MCNC: 2 MMOL/L — SIGNIFICANT CHANGE UP (ref 0.5–2)
LIDOCAIN IGE QN: 56 U/L — SIGNIFICANT CHANGE UP (ref 7–60)
LYMPHOCYTES # BLD AUTO: 1.59 K/UL — SIGNIFICANT CHANGE UP (ref 1–3.3)
LYMPHOCYTES # BLD AUTO: 37.5 % — SIGNIFICANT CHANGE UP (ref 13–44)
MCHC RBC-ENTMCNC: 31.4 GM/DL — LOW (ref 32–36)
MCHC RBC-ENTMCNC: 31.5 PG — SIGNIFICANT CHANGE UP (ref 27–34)
MCV RBC AUTO: 100.3 FL — HIGH (ref 80–100)
MONOCYTES # BLD AUTO: 0.4 K/UL — SIGNIFICANT CHANGE UP (ref 0–0.9)
MONOCYTES NFR BLD AUTO: 9.4 % — SIGNIFICANT CHANGE UP (ref 2–14)
NEUTROPHILS # BLD AUTO: 2.19 K/UL — SIGNIFICANT CHANGE UP (ref 1.8–7.4)
NEUTROPHILS NFR BLD AUTO: 51.7 % — SIGNIFICANT CHANGE UP (ref 43–77)
NRBC # BLD: 0 /100 WBCS — SIGNIFICANT CHANGE UP
NRBC # FLD: 0 K/UL — SIGNIFICANT CHANGE UP
PCO2 BLDV: 42 MMHG — SIGNIFICANT CHANGE UP (ref 41–51)
PH BLDV: 7.2 — CRITICAL LOW (ref 7.32–7.43)
PLATELET # BLD AUTO: 46 K/UL — LOW (ref 150–400)
PO2 BLDV: <24 MMHG — LOW (ref 35–40)
POTASSIUM BLDV-SCNC: 5.8 MMOL/L — HIGH (ref 3.4–4.5)
POTASSIUM SERPL-MCNC: 6.5 MMOL/L — CRITICAL HIGH (ref 3.5–5.3)
POTASSIUM SERPL-SCNC: 6.5 MMOL/L — CRITICAL HIGH (ref 3.5–5.3)
PROT SERPL-MCNC: 8 G/DL — SIGNIFICANT CHANGE UP (ref 6–8.3)
PROTHROM AB SERPL-ACNC: 13.7 SEC — HIGH (ref 10.6–13.6)
RBC # BLD: 3.72 M/UL — LOW (ref 4.2–5.8)
RBC # FLD: 14.5 % — SIGNIFICANT CHANGE UP (ref 10.3–14.5)
SAO2 % BLDV: 23.5 % — LOW (ref 60–85)
SODIUM SERPL-SCNC: 130 MMOL/L — LOW (ref 135–145)
WBC # BLD: 4.24 K/UL — SIGNIFICANT CHANGE UP (ref 3.8–10.5)
WBC # FLD AUTO: 4.24 K/UL — SIGNIFICANT CHANGE UP (ref 3.8–10.5)

## 2021-06-21 PROCEDURE — 99285 EMERGENCY DEPT VISIT HI MDM: CPT

## 2021-06-21 RX ORDER — DEXTROSE 50 % IN WATER 50 %
50 SYRINGE (ML) INTRAVENOUS ONCE
Refills: 0 | Status: COMPLETED | OUTPATIENT
Start: 2021-06-21 | End: 2021-06-21

## 2021-06-21 RX ORDER — SODIUM ZIRCONIUM CYCLOSILICATE 10 G/10G
10 POWDER, FOR SUSPENSION ORAL ONCE
Refills: 0 | Status: COMPLETED | OUTPATIENT
Start: 2021-06-21 | End: 2021-06-21

## 2021-06-21 RX ORDER — INSULIN HUMAN 100 [IU]/ML
5 INJECTION, SOLUTION SUBCUTANEOUS ONCE
Refills: 0 | Status: COMPLETED | OUTPATIENT
Start: 2021-06-21 | End: 2021-06-21

## 2021-06-21 RX ADMIN — INSULIN HUMAN 5 UNIT(S): 100 INJECTION, SOLUTION SUBCUTANEOUS at 23:00

## 2021-06-21 RX ADMIN — Medication 50 MILLILITER(S): at 23:00

## 2021-06-21 RX ADMIN — SODIUM ZIRCONIUM CYCLOSILICATE 10 GRAM(S): 10 POWDER, FOR SUSPENSION ORAL at 23:00

## 2021-06-21 NOTE — ED PROVIDER NOTE - PHYSICAL EXAMINATION
Gen: AAOx3, non-toxic  Head: NCAT  HEENT: EOMI, oral mucosa moist, normal conjunctiva  Lung: CTAB, no respiratory distress, no wheezes/rhonchi/rales B/L, speaking in full sentences  CV: RRR, no murmurs, rubs or gallops  Abd: +distended, non-tender   MSK: no visible deformities  Neuro: No focal sensory or motor deficits  Skin: Warm, well perfused, no rash  Psych: normal affect.     Prashant Beyer PGY3
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
## 2021-06-21 NOTE — ED PROVIDER NOTE - NS ED ROS FT
ROS:  GENERAL: No fever, no chills  EYES: no change in vision  HEENT: no trouble swallowing, no trouble speaking  CARDIAC: no chest pain  PULMONARY: no cough, no shortness of breath  GI: +abd distension.  no abdominal pain, no nausea, no vomiting, no diarrhea, no constipation  : No dysuria, no frequency, no change in appearance, or odor of urine  SKIN: no rashes  NEURO: no headache, no weakness  MSK: No joint pain    Prashant Beyer PGY3

## 2021-06-21 NOTE — ED ADULT NURSE NOTE - OBJECTIVE STATEMENT
received pt to room 2 c/o ascites. Pt states periodically require paracentesis last one 2 weeks ago. Pt denies any abdominal pain, weakness, nausea, SOB. VSS 20 G PIV L AC placed labs sent. Pt noted with skin tear to L forearm states occurred when he attempted to pull off bracelet, dressing applied. Pt given call bell will continue to monitor
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
## 2021-06-21 NOTE — ED ADULT NURSE NOTE - NSIMPLEMENTINTERV_GEN_ALL_ED
Implemented All Fall with Harm Risk Interventions:  [place] to call system. Call bell, personal items and telephone within reach. Instruct patient to call for assistance. Room bathroom lighting operational. Non-slip footwear when patient is off stretcher. Physically safe environment: no spills, clutter or unnecessary equipment. Stretcher in lowest position, wheels locked, appropriate side rails in place. Provide visual cue, wrist band, yellow gown, etc. Monitor gait and stability. Monitor for mental status changes and reorient to person, place, and time. Review medications for side effects contributing to fall risk. Reinforce activity limits and safety measures with patient and family. Provide visual clues: red socks.
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
## 2021-06-22 DIAGNOSIS — D53.9 NUTRITIONAL ANEMIA, UNSPECIFIED: ICD-10-CM

## 2021-06-22 DIAGNOSIS — Z95.828 PRESENCE OF OTHER VASCULAR IMPLANTS AND GRAFTS: Chronic | ICD-10-CM

## 2021-06-22 DIAGNOSIS — D69.6 THROMBOCYTOPENIA, UNSPECIFIED: ICD-10-CM

## 2021-06-22 DIAGNOSIS — E87.5 HYPERKALEMIA: ICD-10-CM

## 2021-06-22 DIAGNOSIS — E03.9 HYPOTHYROIDISM, UNSPECIFIED: ICD-10-CM

## 2021-06-22 DIAGNOSIS — Z87.81 PERSONAL HISTORY OF (HEALED) TRAUMATIC FRACTURE: Chronic | ICD-10-CM

## 2021-06-22 DIAGNOSIS — E87.2 ACIDOSIS: ICD-10-CM

## 2021-06-22 DIAGNOSIS — K70.31 ALCOHOLIC CIRRHOSIS OF LIVER WITH ASCITES: ICD-10-CM

## 2021-06-22 DIAGNOSIS — Z29.9 ENCOUNTER FOR PROPHYLACTIC MEASURES, UNSPECIFIED: ICD-10-CM

## 2021-06-22 DIAGNOSIS — E55.9 VITAMIN D DEFICIENCY, UNSPECIFIED: ICD-10-CM

## 2021-06-22 DIAGNOSIS — N17.9 ACUTE KIDNEY FAILURE, UNSPECIFIED: ICD-10-CM

## 2021-06-22 LAB
A1C WITH ESTIMATED AVERAGE GLUCOSE RESULT: 4.6 % — SIGNIFICANT CHANGE UP (ref 4–5.6)
ALBUMIN SERPL ELPH-MCNC: 3.2 G/DL — LOW (ref 3.3–5)
ALP SERPL-CCNC: 88 U/L — SIGNIFICANT CHANGE UP (ref 40–120)
ALT FLD-CCNC: 17 U/L — SIGNIFICANT CHANGE UP (ref 4–41)
ANION GAP SERPL CALC-SCNC: 11 MMOL/L — SIGNIFICANT CHANGE UP (ref 7–14)
ANION GAP SERPL CALC-SCNC: 11 MMOL/L — SIGNIFICANT CHANGE UP (ref 7–14)
ANION GAP SERPL CALC-SCNC: 14 MMOL/L — SIGNIFICANT CHANGE UP (ref 7–14)
AST SERPL-CCNC: 22 U/L — SIGNIFICANT CHANGE UP (ref 4–40)
BASE EXCESS BLDV CALC-SCNC: -10.1 MMOL/L — LOW (ref -3–2)
BILIRUB SERPL-MCNC: 0.4 MG/DL — SIGNIFICANT CHANGE UP (ref 0.2–1.2)
BLOOD GAS VENOUS - CREATININE: 1.7 MG/DL — HIGH (ref 0.5–1.3)
BLOOD GAS VENOUS COMPREHENSIVE RESULT: SIGNIFICANT CHANGE UP
BLOOD GAS VENOUS COMPREHENSIVE RESULT: SIGNIFICANT CHANGE UP
BUN SERPL-MCNC: 20 MG/DL — SIGNIFICANT CHANGE UP (ref 7–23)
CALCIUM SERPL-MCNC: 9.2 MG/DL — SIGNIFICANT CHANGE UP (ref 8.4–10.5)
CALCIUM SERPL-MCNC: 9.3 MG/DL — SIGNIFICANT CHANGE UP (ref 8.4–10.5)
CALCIUM SERPL-MCNC: 9.3 MG/DL — SIGNIFICANT CHANGE UP (ref 8.4–10.5)
CHLORIDE BLDV-SCNC: 109 MMOL/L — HIGH (ref 96–108)
CHLORIDE SERPL-SCNC: 102 MMOL/L — SIGNIFICANT CHANGE UP (ref 98–107)
CHLORIDE SERPL-SCNC: 103 MMOL/L — SIGNIFICANT CHANGE UP (ref 98–107)
CHLORIDE SERPL-SCNC: 104 MMOL/L — SIGNIFICANT CHANGE UP (ref 98–107)
CO2 SERPL-SCNC: 13 MMOL/L — LOW (ref 22–31)
CO2 SERPL-SCNC: 13 MMOL/L — LOW (ref 22–31)
CO2 SERPL-SCNC: 14 MMOL/L — LOW (ref 22–31)
CREAT SERPL-MCNC: 1.61 MG/DL — HIGH (ref 0.5–1.3)
CREAT SERPL-MCNC: 1.62 MG/DL — HIGH (ref 0.5–1.3)
CREAT SERPL-MCNC: 1.69 MG/DL — HIGH (ref 0.5–1.3)
ESTIMATED AVERAGE GLUCOSE: 85 MG/DL — SIGNIFICANT CHANGE UP (ref 68–114)
GAS PNL BLDV: 131 MMOL/L — LOW (ref 136–146)
GLUCOSE BLDC GLUCOMTR-MCNC: 190 MG/DL — HIGH (ref 70–99)
GLUCOSE BLDC GLUCOMTR-MCNC: 90 MG/DL — SIGNIFICANT CHANGE UP (ref 70–99)
GLUCOSE BLDC GLUCOMTR-MCNC: 91 MG/DL — SIGNIFICANT CHANGE UP (ref 70–99)
GLUCOSE BLDV-MCNC: 158 MG/DL — HIGH (ref 70–99)
GLUCOSE SERPL-MCNC: 131 MG/DL — HIGH (ref 70–99)
GLUCOSE SERPL-MCNC: 154 MG/DL — HIGH (ref 70–99)
GLUCOSE SERPL-MCNC: 86 MG/DL — SIGNIFICANT CHANGE UP (ref 70–99)
HCO3 BLDV-SCNC: 15 MMOL/L — LOW (ref 20–27)
HCT VFR BLD CALC: 32.4 % — LOW (ref 39–50)
HCT VFR BLD CALC: 35.9 % — LOW (ref 39–50)
HCT VFR BLDA CALC: 36 % — LOW (ref 39–51)
HGB BLD CALC-MCNC: 11.7 G/DL — LOW (ref 13–17)
HGB BLD-MCNC: 10.3 G/DL — LOW (ref 13–17)
HGB BLD-MCNC: 11.6 G/DL — LOW (ref 13–17)
INR BLD: 1.25 RATIO — HIGH (ref 0.88–1.16)
LACTATE BLDV-MCNC: 2.9 MMOL/L — HIGH (ref 0.5–2)
MAGNESIUM SERPL-MCNC: 1.2 MG/DL — LOW (ref 1.6–2.6)
MAGNESIUM SERPL-MCNC: 1.3 MG/DL — LOW (ref 1.6–2.6)
MCHC RBC-ENTMCNC: 31.8 GM/DL — LOW (ref 32–36)
MCHC RBC-ENTMCNC: 32 PG — SIGNIFICANT CHANGE UP (ref 27–34)
MCHC RBC-ENTMCNC: 32.1 PG — SIGNIFICANT CHANGE UP (ref 27–34)
MCHC RBC-ENTMCNC: 32.3 GM/DL — SIGNIFICANT CHANGE UP (ref 32–36)
MCV RBC AUTO: 100.9 FL — HIGH (ref 80–100)
MCV RBC AUTO: 99.2 FL — SIGNIFICANT CHANGE UP (ref 80–100)
NRBC # BLD: 0 /100 WBCS — SIGNIFICANT CHANGE UP
NRBC # BLD: 0 /100 WBCS — SIGNIFICANT CHANGE UP
NRBC # FLD: 0 K/UL — SIGNIFICANT CHANGE UP
NRBC # FLD: 0 K/UL — SIGNIFICANT CHANGE UP
PCO2 BLDV: 39 MMHG — LOW (ref 41–51)
PH BLDV: 7.23 — LOW (ref 7.32–7.43)
PHOSPHATE SERPL-MCNC: 3.8 MG/DL — SIGNIFICANT CHANGE UP (ref 2.5–4.5)
PHOSPHATE SERPL-MCNC: 4.1 MG/DL — SIGNIFICANT CHANGE UP (ref 2.5–4.5)
PLATELET # BLD AUTO: 45 K/UL — LOW (ref 150–400)
PLATELET # BLD AUTO: 49 K/UL — LOW (ref 150–400)
PO2 BLDV: <24 MMHG — LOW (ref 35–40)
POTASSIUM BLDV-SCNC: 4.8 MMOL/L — HIGH (ref 3.4–4.5)
POTASSIUM SERPL-MCNC: 5.1 MMOL/L — SIGNIFICANT CHANGE UP (ref 3.5–5.3)
POTASSIUM SERPL-MCNC: 5.5 MMOL/L — HIGH (ref 3.5–5.3)
POTASSIUM SERPL-MCNC: 5.8 MMOL/L — HIGH (ref 3.5–5.3)
POTASSIUM SERPL-SCNC: 5.1 MMOL/L — SIGNIFICANT CHANGE UP (ref 3.5–5.3)
POTASSIUM SERPL-SCNC: 5.5 MMOL/L — HIGH (ref 3.5–5.3)
POTASSIUM SERPL-SCNC: 5.8 MMOL/L — HIGH (ref 3.5–5.3)
PROT SERPL-MCNC: 6.9 G/DL — SIGNIFICANT CHANGE UP (ref 6–8.3)
PROTHROM AB SERPL-ACNC: 14.1 SEC — HIGH (ref 10.6–13.6)
RBC # BLD: 3.21 M/UL — LOW (ref 4.2–5.8)
RBC # BLD: 3.62 M/UL — LOW (ref 4.2–5.8)
RBC # FLD: 14.5 % — SIGNIFICANT CHANGE UP (ref 10.3–14.5)
RBC # FLD: 14.6 % — HIGH (ref 10.3–14.5)
SAO2 % BLDV: 27.7 % — LOW (ref 60–85)
SARS-COV-2 RNA SPEC QL NAA+PROBE: SIGNIFICANT CHANGE UP
SODIUM SERPL-SCNC: 126 MMOL/L — LOW (ref 135–145)
SODIUM SERPL-SCNC: 128 MMOL/L — LOW (ref 135–145)
SODIUM SERPL-SCNC: 131 MMOL/L — LOW (ref 135–145)
WBC # BLD: 4.11 K/UL — SIGNIFICANT CHANGE UP (ref 3.8–10.5)
WBC # BLD: 4.31 K/UL — SIGNIFICANT CHANGE UP (ref 3.8–10.5)
WBC # FLD AUTO: 4.11 K/UL — SIGNIFICANT CHANGE UP (ref 3.8–10.5)
WBC # FLD AUTO: 4.31 K/UL — SIGNIFICANT CHANGE UP (ref 3.8–10.5)

## 2021-06-22 PROCEDURE — 12345: CPT | Mod: NC

## 2021-06-22 PROCEDURE — 99223 1ST HOSP IP/OBS HIGH 75: CPT | Mod: GC

## 2021-06-22 PROCEDURE — 76770 US EXAM ABDO BACK WALL COMP: CPT | Mod: 26

## 2021-06-22 PROCEDURE — 99223 1ST HOSP IP/OBS HIGH 75: CPT

## 2021-06-22 RX ORDER — FOLIC ACID 0.8 MG
1 TABLET ORAL DAILY
Refills: 0 | Status: DISCONTINUED | OUTPATIENT
Start: 2021-06-22 | End: 2021-06-24

## 2021-06-22 RX ORDER — LEVOTHYROXINE SODIUM 125 MCG
100 TABLET ORAL DAILY
Refills: 0 | Status: DISCONTINUED | OUTPATIENT
Start: 2021-06-22 | End: 2021-06-24

## 2021-06-22 RX ORDER — DEXTROSE 50 % IN WATER 50 %
50 SYRINGE (ML) INTRAVENOUS ONCE
Refills: 0 | Status: DISCONTINUED | OUTPATIENT
Start: 2021-06-22 | End: 2021-06-22

## 2021-06-22 RX ORDER — DEXTROSE 50 % IN WATER 50 %
50 SYRINGE (ML) INTRAVENOUS ONCE
Refills: 0 | Status: COMPLETED | OUTPATIENT
Start: 2021-06-22 | End: 2021-06-22

## 2021-06-22 RX ORDER — INSULIN HUMAN 100 [IU]/ML
5 INJECTION, SOLUTION SUBCUTANEOUS ONCE
Refills: 0 | Status: COMPLETED | OUTPATIENT
Start: 2021-06-22 | End: 2021-06-22

## 2021-06-22 RX ORDER — LACTULOSE 10 G/15ML
10 SOLUTION ORAL DAILY
Refills: 0 | Status: DISCONTINUED | OUTPATIENT
Start: 2021-06-22 | End: 2021-06-24

## 2021-06-22 RX ORDER — MAGNESIUM SULFATE 500 MG/ML
2 VIAL (ML) INJECTION ONCE
Refills: 0 | Status: COMPLETED | OUTPATIENT
Start: 2021-06-22 | End: 2021-06-22

## 2021-06-22 RX ORDER — INSULIN HUMAN 100 [IU]/ML
5 INJECTION, SOLUTION SUBCUTANEOUS ONCE
Refills: 0 | Status: DISCONTINUED | OUTPATIENT
Start: 2021-06-22 | End: 2021-06-22

## 2021-06-22 RX ORDER — MAGNESIUM OXIDE 400 MG ORAL TABLET 241.3 MG
400 TABLET ORAL
Refills: 0 | Status: DISCONTINUED | OUTPATIENT
Start: 2021-06-22 | End: 2021-06-24

## 2021-06-22 RX ORDER — FERROUS SULFATE 325(65) MG
325 TABLET ORAL DAILY
Refills: 0 | Status: DISCONTINUED | OUTPATIENT
Start: 2021-06-22 | End: 2021-06-24

## 2021-06-22 RX ORDER — CHOLECALCIFEROL (VITAMIN D3) 125 MCG
2000 CAPSULE ORAL DAILY
Refills: 0 | Status: DISCONTINUED | OUTPATIENT
Start: 2021-06-22 | End: 2021-06-24

## 2021-06-22 RX ADMIN — Medication 50 MILLILITER(S): at 06:18

## 2021-06-22 RX ADMIN — LACTULOSE 10 GRAM(S): 10 SOLUTION ORAL at 06:23

## 2021-06-22 RX ADMIN — MAGNESIUM OXIDE 400 MG ORAL TABLET 400 MILLIGRAM(S): 241.3 TABLET ORAL at 17:31

## 2021-06-22 RX ADMIN — INSULIN HUMAN 5 UNIT(S): 100 INJECTION, SOLUTION SUBCUTANEOUS at 06:19

## 2021-06-22 RX ADMIN — Medication 50 GRAM(S): at 13:53

## 2021-06-22 RX ADMIN — Medication 2000 UNIT(S): at 13:53

## 2021-06-22 RX ADMIN — Medication 1 MILLIGRAM(S): at 13:53

## 2021-06-22 RX ADMIN — Medication 325 MILLIGRAM(S): at 13:53

## 2021-06-22 RX ADMIN — Medication 100 MICROGRAM(S): at 06:18

## 2021-06-22 NOTE — H&P ADULT - ASSESSMENT
60-year-old male with alcoholic cirrhosis, CKD, presenting from home with 5 weeks of increasing abdominal ascites, found to have YOVANI with hyperkalemia

## 2021-06-22 NOTE — H&P ADULT - PROBLEM SELECTOR PLAN 6
in setting of cirrhosis  improved from prior admission chronic/stable, improved H/H from prior admission  monitor/trend    c/w folic acid supplementation (low on outpt labs)

## 2021-06-22 NOTE — H&P ADULT - PROBLEM SELECTOR PLAN 9
hold pharmacologic DVT ppx in light of thrombocytopenia and plan for procedure in AM; has IVC filter

## 2021-06-22 NOTE — PROGRESS NOTE ADULT - PROBLEM SELECTOR PLAN 4
chronic, possibly in setting of YOVANI on CKD; chronic loose stool in setting of lactulose use  BUN wnl, denies alcohol intake xmonths, lactate wnl  Noted increase in lactate -- will consider given hydration with IV albumin

## 2021-06-22 NOTE — H&P ADULT - PROBLEM SELECTOR PLAN 1
present on prior admission as well, possibly due to large volume ascites/hepatorenal  reports increased frequency of urination at times, will check bladder scan to r/o urinary retention  needs therapeutic tap in AM (had large volume tap on prior admission, 5/13/21 just under 11L), will require albumin with tap  avoid nephrotoxins, trend Cr  hold spironolactone at present  monitor I/Os  may require renal input  renal renal u/s as above

## 2021-06-22 NOTE — CONSULT NOTE ADULT - SUBJECTIVE AND OBJECTIVE BOX
Chief Complaint:  Patient is a 60y old  Male who presents with a chief complaint of abdominal distention (22 Jun 2021 02:53)      HPI: 60-year-old male with alcoholic cirrhosis, hypothyroid, CKD, presenting from home with 5 weeks of increasing abdominal distention. He denies any abdominal pain, shortness of breath, nausea, vomiting, diarrhea or constipation, no fevers or chills. Reports adherence with medications, with 2-3BM/day taking lactulose as needed. Patient reports furosemide was discontinued due to his kidney function however still taking spironolactone.     In the ED VS:  98  72-82  /63-78  18  100%RA, received D50% 50mL IV/regular insulin 4units IV x1, sodium zirconium cyclosilicate 10g PO x1    Last admitted in May with same abd distension. He was discharged on lasix 40, spironolactone 100. He had a para done 5/12 with 10.85L removed. Neg for SBP.       Allergies:  No Known Allergies      Home Medications:    Hospital Medications:  cholecalciferol 2000 Unit(s) Oral daily  ferrous    sulfate 325 milliGRAM(s) Oral daily  folic acid 1 milliGRAM(s) Oral daily  lactulose Syrup 10 Gram(s) Oral daily  levothyroxine 100 MICROGram(s) Oral daily      PMHX/PSHX:  Cirrhosis    Hypertension    CKD (chronic kidney disease)    Hypothyroid    S/P craniotomy    Presence of IVC filter    H/O fracture of leg        Family history:  No pertinent family history in first degree relatives        Social History: drinking since age 20, quit 1/2021    ROS:     General:  No weight loss, fevers, chills, night sweats, fatigue  Eyes:  No vision changes, no yellowing of eyes   ENT:  No throat pain, runny nose  CV:  No chest pain, palpitations  Resp:  No SOB, cough, wheezing  GI:  See HPI  :  No burning with urination, no hematuria   Muscle:  No muscle pain, weakness  Neuro:  No numbness/tingling, memory problems  Psych:  No fatigue, insomnia, mood problems  Heme:  No easy bruisability  Skin:  No rash, itching       PHYSICAL EXAM:     GENERAL:  Appears stated age, well-groomed, well-nourished, no distress  HEENT:  NC/AT,  conjunctivae clear and pink,  no JVD  CHEST:  Full & symmetric excursion, no increased effort, breath sounds clear  HEART:  Regular rhythm, S1, S2, no murmur/rub/S3/S4, no abdominal bruit, no edema  ABDOMEN:  Soft, non-tender, non-distended, normoactive bowel sounds,  no masses ,  EXTREMITIES:  no cyanosis,clubbing or edema  SKIN:  No rash/erythema/ecchymoses/petechiae/wounds/abscess/warm/dry  NEURO:  Alert, oriented    Vital Signs:  Vital Signs Last 24 Hrs  T(C): 36.4 (22 Jun 2021 03:43), Max: 36.7 (21 Jun 2021 16:59)  T(F): 97.6 (22 Jun 2021 03:43), Max: 98 (21 Jun 2021 16:59)  HR: 88 (22 Jun 2021 03:43) (72 - 88)  BP: 98/65 (22 Jun 2021 03:43) (93/63 - 115/78)  BP(mean): --  RR: 17 (22 Jun 2021 03:43) (17 - 18)  SpO2: 100% (22 Jun 2021 03:43) (100% - 100%)  Daily Height in cm: 167.64 (21 Jun 2021 16:59)    Daily     LABS:                        10.3   4.11  )-----------( 49       ( 22 Jun 2021 04:36 )             32.4     06-22    126<L>  |  102  |  20  ----------------------------<  86  5.8<H>   |  13<L>  |  1.61<H>    Ca    9.3      22 Jun 2021 04:36  Phos  3.8     06-22  Mg     1.3     06-22    TPro  6.9  /  Alb  3.2<L>  /  TBili  0.4  /  DBili  x   /  AST  22  /  ALT  17  /  AlkPhos  88  06-22    LIVER FUNCTIONS - ( 22 Jun 2021 04:36 )  Alb: 3.2 g/dL / Pro: 6.9 g/dL / ALK PHOS: 88 U/L / ALT: 17 U/L / AST: 22 U/L / GGT: x           PT/INR - ( 22 Jun 2021 04:36 )   PT: 14.1 sec;   INR: 1.25 ratio         PTT - ( 21 Jun 2021 21:31 )  PTT:37.1 sec    Amylase Serum--      Lipase serum56       Ammonia--      Imaging:             Chief Complaint:  Patient is a 60y old  Male who presents with a chief complaint of abdominal distention (22 Jun 2021 02:53)      HPI: 60-year-old male with alcoholic cirrhosis, hypothyroid, CKD, presenting from home with 5 weeks of increasing abdominal distention. He denies any abdominal pain, shortness of breath, nausea, vomiting, diarrhea or constipation, no fevers or chills. Reports adherence with medications, with 2-3BM/day taking lactulose as needed. Patient reports furosemide was discontinued due to his kidney function however still taking spironolactone. Patient is agitated regarding number of blood draws. He denies any other symptoms other than abd distension. No f/c, abd pain, n/v.     In the ED VS:  98  72-82  /63-78  18  100%RA, received D50% 50mL IV/regular insulin 4units IV x1, sodium zirconium cyclosilicate 10g PO x1    Last admitted in May with same abd distension. He was discharged on lasix 40, spironolactone 100. He had a para done 5/12 with 10.85L removed. Neg for SBP.       Allergies:  No Known Allergies      Home Medications:    Hospital Medications:  cholecalciferol 2000 Unit(s) Oral daily  ferrous    sulfate 325 milliGRAM(s) Oral daily  folic acid 1 milliGRAM(s) Oral daily  lactulose Syrup 10 Gram(s) Oral daily  levothyroxine 100 MICROGram(s) Oral daily      PMHX/PSHX:  Cirrhosis    Hypertension    CKD (chronic kidney disease)    Hypothyroid    S/P craniotomy    Presence of IVC filter    H/O fracture of leg        Family history:  No pertinent family history in first degree relatives        Social History: drinking since age 20, quit 1/2021    ROS:     General:  No weight loss, fevers, chills, night sweats, fatigue  Eyes:  No vision changes, no yellowing of eyes   ENT:  No throat pain, runny nose  CV:  No chest pain, palpitations  Resp:  No SOB, cough, wheezing  GI:  See HPI  :  No burning with urination, no hematuria   Muscle:  No muscle pain, weakness  Neuro:  No numbness/tingling, memory problems  Psych:  No fatigue, insomnia, mood problems  Heme:  No easy bruisability  Skin:  No rash, itching       PHYSICAL EXAM:     GENERAL:  cachectic, no distress  HEENT:  NC/AT,  conjunctivae clear and pink  CHEST:  Full & symmetric excursion, no increased effort, breath sounds clear, spider angiomas  HEART:  Regular rate and rhythm  ABDOMEN:  Soft, non-tender, distended  EXTREMITIES:  no cyanosis, clubbing or edema  SKIN:  No rash/erythema  NEURO:  Alert, oriented, no asterixis    Vital Signs:  Vital Signs Last 24 Hrs  T(C): 36.4 (22 Jun 2021 03:43), Max: 36.7 (21 Jun 2021 16:59)  T(F): 97.6 (22 Jun 2021 03:43), Max: 98 (21 Jun 2021 16:59)  HR: 88 (22 Jun 2021 03:43) (72 - 88)  BP: 98/65 (22 Jun 2021 03:43) (93/63 - 115/78)  BP(mean): --  RR: 17 (22 Jun 2021 03:43) (17 - 18)  SpO2: 100% (22 Jun 2021 03:43) (100% - 100%)  Daily Height in cm: 167.64 (21 Jun 2021 16:59)    Daily     LABS:                        10.3   4.11  )-----------( 49       ( 22 Jun 2021 04:36 )             32.4     06-22    126<L>  |  102  |  20  ----------------------------<  86  5.8<H>   |  13<L>  |  1.61<H>    Ca    9.3      22 Jun 2021 04:36  Phos  3.8     06-22  Mg     1.3     06-22    TPro  6.9  /  Alb  3.2<L>  /  TBili  0.4  /  DBili  x   /  AST  22  /  ALT  17  /  AlkPhos  88  06-22    LIVER FUNCTIONS - ( 22 Jun 2021 04:36 )  Alb: 3.2 g/dL / Pro: 6.9 g/dL / ALK PHOS: 88 U/L / ALT: 17 U/L / AST: 22 U/L / GGT: x           PT/INR - ( 22 Jun 2021 04:36 )   PT: 14.1 sec;   INR: 1.25 ratio         PTT - ( 21 Jun 2021 21:31 )  PTT:37.1 sec    Amylase Serum--      Lipase serum56       Ammonia--      Imaging:

## 2021-06-22 NOTE — H&P ADULT - PROBLEM SELECTOR PLAN 2
likely in setting of YOVANI  repeat BMP with improving Cr  s/p temporizing measures as well as sodium zirconium cyclosilicate   patient taking multiple supplements, juices daily, nutrition consult placed  repeat BMP pending likely in setting of YOVANI  repeat BMP with improving Cr  s/p temporizing measures as well as sodium zirconium cyclosilicate   patient taking multiple supplements, juices daily, nutrition consult placed  repeat BMP pending  monitor on tele likely in setting of YOVANI  repeat BMP with improving Cr  s/p temporizing measures as well as sodium zirconium cyclosilicate   patient taking multiple supplements, juices daily, nutrition consult placed  repeat BMP pending  monitor on tele    Addendum - repeat BMP, still hyperkalemic, gave more insulin/D50; held off on further sodium zirconium cyclosilicate for now given sodium load of ~800mg in 10g dose, instead dosed daily lactulose; continue to monitor K

## 2021-06-22 NOTE — H&P ADULT - NSHPSOCIALHISTORY_GEN_ALL_CORE
Lives with wife  Quit drinking 4 months ago, previously a heavy drinker  smokes 2 cigarettes/day  Denies illicit drug use  Not presently working, on disability for past few years, previously did multiple jobs at a golf club, in [place] he was an  ~20yrs ago
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
## 2021-06-22 NOTE — PROGRESS NOTE ADULT - ASSESSMENT
60-year-old male with decompensated alcoholic cirrhosis with recurrent ascites and CKD presenting from home with 5 weeks of increasing abdominal ascites, found to have YOVANI with hyperkalemia. Hyperkalemia resolved and pt awaiting therapeutic paracentesis.

## 2021-06-22 NOTE — H&P ADULT - NSHPREVIEWOFSYSTEMS_GEN_ALL_CORE
REVIEW OF SYSTEMS:    CONSTITUTIONAL: No weakness, fevers or chills; reports decreased appetite of recent associated with increasing abdominal distention  EYES/ENT: No visual changes;  No dysphagia; No sore throat; No rhinorrhea; No sinus pain/pressure  NECK: No pain or stiffness  RESPIRATORY: No cough, wheezing, hemoptysis; No shortness of breath  CARDIOVASCULAR: No chest pain or palpitations; No lower extremity edema  GASTROINTESTINAL: No abdominal or epigastric pain. No nausea, vomiting, or hematemesis; No diarrhea or constipation. 2-3BM/day. No melena or hematochezia. Gradual abdominal distention x5 weeks  GENITOURINARY: No dysuria or hematuria; chronic increased frequency of urination   NEUROLOGICAL: No numbness, paresthesias or weakness; No HA  MSK: ambulates without aid at home, uses wheelchair outside of house; No falls  SKIN: No itching, burning, rashes, or lesions   HEME: No epistaxis, no bleeding gums  All other review of systems is negative unless indicated above.

## 2021-06-22 NOTE — CONSULT NOTE ADULT - ATTENDING COMMENTS
A 60-year-old man with alcoholic cirrhosis, hypothyroid, CKD, presented with worsening  abdominal distention. He denies any abdominal pain, shortness of breath, nausea, vomiting, diarrhea or constipation, no fevers or chills. Reports adherence with medications, with 2-3BM/day taking lactulose as needed. Patient reports furosemide was discontinued due to his kidney function however still taking spironolactone. Patient is agitated regarding number of blood draws. He denies any other symptoms other than abd distension. No f/c, abd pain, n/v.     In the ED VS:  98  72-82  /63-78  18  100%RA, received D50% 50mL IV/regular insulin 4units IV x1, sodium zirconium cyclosilicate 10g PO x1    Last admitted in May with same abd distension. He was discharged on lasix 40, spironolactone 100. He had a para done 5/12 with 10.85L removed. Neg for SBP. A 60-year-old man with hypothyroid, CKD, alcoholic cirrhosis decompensated by ascites s/p multiple LVPs  treated with diuretics, presented with worsening  abdominal distention.   Last drink reportedly was 1/2021. Furosemide was discontinued due to his kidney function. Patient is awake, alert and oriented x3, no asterixis. Eyes: not icteric, abdomen marked distended, no leg edema.   Labs showed Cr 1.61, Na 126, INR 1.25, PLT 49 K, Hb 10.3,  TB 0.4, MELD-Na 23 driven more from hyponatremia.    A/P: alcoholic cirrhosis decompensated by ascites, s/p LVP with recurrent worsening ascites.   Would recommend- diagnostic and therapeutic paracentesis, abdominal US doppler, hold diuretics, trend Na, liver tests, and INR, continue lactulose and care per primary team.

## 2021-06-22 NOTE — PROGRESS NOTE ADULT - SUBJECTIVE AND OBJECTIVE BOX
Patient is a 60y old  Male who presents with a chief complaint of abdominal distention (22 Jun 2021 10:33)    SUBJECTIVE / OVERNIGHT EVENTS:  Patient frustrated with having to see so many doctors as an outpatient and recurrent hospitalization due to his ascites. He has no fever, chills, chest pain, SOB, n/v or abdominal pain. Pt with BM this AM. Pt stated he is urinating. No LE swelling.     MEDICATIONS  (STANDING):  cholecalciferol 2000 Unit(s) Oral daily  ferrous sulfate 325 milliGRAM(s) Oral daily  folic acid 1 milliGRAM(s) Oral daily  lactulose Syrup 10 Gram(s) Oral daily  levothyroxine 100 MICROGram(s) Oral daily  magnesium oxide 400 milliGRAM(s) Oral two times a day with meals        Vital Signs Last 24 Hrs  T(C): 36.7 (22 Jun 2021 13:06), Max: 36.7 (21 Jun 2021 16:59)  T(F): 98 (22 Jun 2021 13:06), Max: 98 (21 Jun 2021 16:59)  HR: 63 (22 Jun 2021 13:06) (63 - 88)  BP: 92/63 (22 Jun 2021 13:06) (92/63 - 115/78)  RR: 17 (22 Jun 2021 13:06) (17 - 18)  SpO2: 100% (22 Jun 2021 13:06) (100% - 100%)  CAPILLARY BLOOD GLUCOSE      POCT Blood Glucose.: 91 mg/dL (22 Jun 2021 12:29)  POCT Blood Glucose.: 190 mg/dL (22 Jun 2021 07:04)  POCT Blood Glucose.: 90 mg/dL (22 Jun 2021 06:17)  POCT Blood Glucose.: 168 mg/dL (21 Jun 2021 23:27)  POCT Blood Glucose.: 86 mg/dL (21 Jun 2021 22:54)          PHYSICAL EXAM  GENERAL: NAD, chronically ill but non-toxic appearing  CHEST/LUNG: Clear to auscultation bilaterally; No wheeze  HEART: Regular rate and rhythm; No murmurs, rubs, or gallops  ABDOMEN: Distended; nontender; Bowel sounds present  EXTREMITIES:  2+ Peripheral Pulses, No clubbing, cyanosis, or edema  PSYCH: AAOx3, good medical insight, conversant, cooperative  SKIN: scattered telangectasia on face and chest and arms b/l        LABS:                        11.6   4.31  )-----------( 45       ( 22 Jun 2021 10:42 )             35.9     06-22    131<L>  |  103  |  20  ----------------------------<  154<H>  5.1   |  14<L>  |  1.69<H>    Ca    9.2      22 Jun 2021 10:42  Phos  4.1     06-22  Mg     1.2     06-22    TPro  6.9  /  Alb  3.2<L>  /  TBili  0.4  /  DBili  x   /  AST  22  /  ALT  17  /  AlkPhos  88  06-22    PT/INR - ( 22 Jun 2021 04:36 )   PT: 14.1 sec;   INR: 1.25 ratio         PTT - ( 21 Jun 2021 21:31 )  PTT:37.1 sec          RADIOLOGY & ADDITIONAL TESTS:    Imaging Personally Reviewed:  Consultant(s) Notes Reviewed:    Care Discussed with Consultants/Other Providers:

## 2021-06-22 NOTE — PROGRESS NOTE ADULT - PROBLEM SELECTOR PLAN 3
MELD-Na score 20 - indicating 3-4% 90-day mortality risk  Hepatology consulted, recs reviewed and appreciated  - fluid restrict, diuretics on hold in light of soft BP/hyperK  - therapeutic/diag paracentesis tomorrow - will obtain cell count, albumin, protein, LDH, glucose, gram stain and culture, and cytology   - will discuss with hepatology if patient would eventually be candidate for TIPS given quickly recurring ascites

## 2021-06-22 NOTE — H&P ADULT - NSICDXPASTMEDICALHX_GEN_ALL_CORE_FT
PAST MEDICAL HISTORY:  Cirrhosis     CKD (chronic kidney disease)     Hypertension      PAST MEDICAL HISTORY:  Cirrhosis     CKD (chronic kidney disease)     Hypertension     Hypothyroid

## 2021-06-22 NOTE — H&P ADULT - NSHPPHYSICALEXAM_GEN_ALL_CORE
PHYSICAL EXAM:  GENERAL: NAD, well-developed, thin  HEAD:  Atraumatic, temporal wasting b/l  EYES: PERRL, conjunctiva and sclera clear  NECK: Supple, No LAD  CHEST/LUNG: Clear to auscultation bilaterally; No wheezes, rales or rhonchi  HEART: Regular rate and rhythm; No murmurs, rubs, or gallops, (+)S1, S2  ABDOMEN: Soft, Nontender, Normal Bowel sounds; (+)fluid distended  EXTREMITIES:  2+ Peripheral Pulses, No clubbing, cyanosis, or edema  PSYCH: normal mood and affect  NEUROLOGY: AAOx3, non-focal, no asterixis  SKIN: No rashes or lesions

## 2021-06-22 NOTE — CHART NOTE - NSCHARTNOTEFT_GEN_A_CORE
Spoke with Procedure Team.  Plan for therapeutic paracentesis tomorrow.       Keren Arora NP   pager [phone]

## 2021-06-22 NOTE — ED ADULT NURSE REASSESSMENT NOTE - NS ED NURSE REASSESS COMMENT FT1
MD De Los Santos made aware of critical value on VBG of PH 7.2 in real time of phone call from lab, no new orders

## 2021-06-22 NOTE — PROGRESS NOTE ADULT - PROBLEM SELECTOR PLAN 1
present on prior admission as well, possibly due to large volume ascites/hepatorenal. Renal and bladder US unremarkable, no signs of obstruction  avoid nephrotoxins, trend Cr  hold spironolactone and furosemide  monitor I/Os

## 2021-06-22 NOTE — H&P ADULT - HISTORY OF PRESENT ILLNESS
60-year-old male with alcoholic cirrhosis, CKD, HTN, presenting from home with 5 weeks of increasing abdominal distention. He denies any abdominal pain, shortness of breath, nausea, vomiting, diarrhea or constipation, no fevers or chills. Reports adherence with medications, with 2-3BM/day taking lactulose as needed. Patient reports furosemide was discontinued due to his kidney function however still taking spironolactone. Notes frustration with volume of outpatient providers and difficulty obtaining results of testing in the outpatient setting.     In the ED VS:  98  72-82  /63-78  18  100%RA, received D50% 50mL IV/regular insulin 4units IV x1, sodium zirconium cyclosilicate 10g PO x1 60-year-old male with alcoholic cirrhosis, CKD, presenting from home with 5 weeks of increasing abdominal distention. He denies any abdominal pain, shortness of breath, nausea, vomiting, diarrhea or constipation, no fevers or chills. Reports adherence with medications, with 2-3BM/day taking lactulose as needed. Patient reports furosemide was discontinued due to his kidney function however still taking spironolactone. Notes frustration with volume of outpatient providers and difficulty obtaining results of testing in the outpatient setting.     In the ED VS:  98  72-82  /63-78  18  100%RA, received D50% 50mL IV/regular insulin 4units IV x1, sodium zirconium cyclosilicate 10g PO x1 60-year-old male with alcoholic cirrhosis, hypothyroid, CKD, presenting from home with 5 weeks of increasing abdominal distention. He denies any abdominal pain, shortness of breath, nausea, vomiting, diarrhea or constipation, no fevers or chills. Reports adherence with medications, with 2-3BM/day taking lactulose as needed. Patient reports furosemide was discontinued due to his kidney function however still taking spironolactone. Notes frustration with volume of outpatient providers and difficulty obtaining results of testing in the outpatient setting.     In the ED VS:  98  72-82  /63-78  18  100%RA, received D50% 50mL IV/regular insulin 4units IV x1, sodium zirconium cyclosilicate 10g PO x1

## 2021-06-22 NOTE — H&P ADULT - PROBLEM SELECTOR PLAN 3
(+)abdominal distention, will need tap and albumin in AM, call procedure team in AM for tap  MELD score 21, hepatology consult in AM  spironolactone on hold in light of hyperK, soft BPs, will hold off on furosemide until s/p albumin  fluid restrict, monitor I/Os    coagulopathy, hyponatremia secondary to cirrhosis - fluid restrict, diuretics on hold in light of soft BP/hyperK

## 2021-06-22 NOTE — PROGRESS NOTE ADULT - PROBLEM SELECTOR PLAN 2
likely in setting of YOVANI and potassium sparing diuretic, spironolactone. Now resolved with medical management  patient taking multiple supplements, juices daily  - d/c telemetry  - routine monitoring of electrolytes

## 2021-06-22 NOTE — H&P ADULT - PROBLEM SELECTOR PLAN 4
chronic, possibly in setting of YOVANI on CKD; chronic loose stool in setting of lactulose use  BUN wnl, denies alcohol intake xmonths, lactate wnl  repeat VBG pending chronic, possibly in setting of YOVANI on CKD; chronic loose stool in setting of lactulose use  BUN wnl, denies alcohol intake xmonths, lactate wnl  repeat VBG pending  recent outpt CO2 of 11 on 6/07

## 2021-06-22 NOTE — H&P ADULT - NSICDXPASTSURGICALHX_GEN_ALL_CORE_FT
PAST SURGICAL HISTORY:  S/P craniotomy      PAST SURGICAL HISTORY:  Presence of IVC filter on CT scan    S/P craniotomy      PAST SURGICAL HISTORY:  H/O fracture of leg s/p hardware with later removal (in [place])    Presence of IVC filter on CT scan    S/P craniotomy

## 2021-06-22 NOTE — H&P ADULT - PROBLEM SELECTOR PLAN 7
hold pharmacologic DVT ppx in light of thrombocytopenia and plan for procedure in AM; has IVC filter in setting of cirrhosis  improved from prior admission

## 2021-06-22 NOTE — H&P ADULT - NSHPLABSRESULTS_GEN_ALL_CORE
11.7   4.24  )-----------( 46       ( 21 Jun 2021 21:31 )             37.3     06-22    128<L>  |  104  |  20  ----------------------------<  131<H>  5.5<H>   |  13<L>  |  1.62<H>    Ca    9.3      22 Jun 2021 00:39    TPro  8.0  /  Alb  3.8  /  TBili  0.6  /  DBili  x   /  AST  27  /  ALT  22  /  AlkPhos  99  06-21    PT/INR - ( 21 Jun 2021 21:31 )   PT: 13.7 sec;   INR: 1.20 ratio    PTT - ( 21 Jun 2021 21:31 )  PTT:37.1 sec    23:39 - VBG - pH: 7.20  | pCO2: 42    | pO2: <24   | Lactate: 2.0    Lipase, Serum: 56 U/L (06.21.21 @ 21:31)    COVID-19 PCR: NotDetec (06.21.21 @ 21:35)    EKG personally reviewed - 70bpm NSR, TWI III, LAD, low voltage QRS; QTc 414ms    < from: US Kidney and Bladder (05.11.21 @ 16:33) >  Right kidney: 9.7 cm. No renal mass, hydronephrosis or calculi.  Left kidney: 9.2 cm. No renal mass, hydronephrosis or calculi.  Urinary bladder: Not visualized.  Other: Large amount of ascites. Cirrhotic liver.  IMPRESSION: No hydronephrosis of either kidney. Cirrhosis and large volume abdominal ascites.  < end of copied text > 11.7   4.24  )-----------( 46       ( 21 Jun 2021 21:31 )             37.3     06-22    128<L>  |  104  |  20  ----------------------------<  131<H>  5.5<H>   |  13<L>  |  1.62<H>    Ca    9.3      22 Jun 2021 00:39    TPro  8.0  /  Alb  3.8  /  TBili  0.6  /  DBili  x   /  AST  27  /  ALT  22  /  AlkPhos  99  06-21    PT/INR - ( 21 Jun 2021 21:31 )   PT: 13.7 sec;   INR: 1.20 ratio    PTT - ( 21 Jun 2021 21:31 )  PTT:37.1 sec    23:39 - VBG - pH: 7.20  | pCO2: 42    | pO2: <24   | Lactate: 2.0    Lipase, Serum: 56 U/L (06.21.21 @ 21:31)    COVID-19 PCR: NotDetec (06.21.21 @ 21:35)    EKG personally reviewed - 70bpm NSR, TWI III, LAD, low voltage QRS; QTc 414ms    Patient presents with outpatient labs from 6/07  H/H 10.5/31.8, Plt 47  Vit B12 - 607pg/mL  folate 4.7 ng/mL   Na 134/K 5.7  CO2 11  BUN/Cr - 22/1.3  25-hydroxy Vit D 11.4 ng/mL  5/19 labs: iron 39 ug/dL; TIBC 221 ug/dL; ferritin 345 ng/mL)    < from: US Kidney and Bladder (05.11.21 @ 16:33) >  Right kidney: 9.7 cm. No renal mass, hydronephrosis or calculi.  Left kidney: 9.2 cm. No renal mass, hydronephrosis or calculi.  Urinary bladder: Not visualized.  Other: Large amount of ascites. Cirrhotic liver.  IMPRESSION: No hydronephrosis of either kidney. Cirrhosis and large volume abdominal ascites.  < end of copied text >

## 2021-06-23 LAB
ALBUMIN FLD-MCNC: 1.7 G/DL — SIGNIFICANT CHANGE UP
ALBUMIN SERPL ELPH-MCNC: 3 G/DL — LOW (ref 3.3–5)
ALP SERPL-CCNC: 84 U/L — SIGNIFICANT CHANGE UP (ref 40–120)
ALT FLD-CCNC: 16 U/L — SIGNIFICANT CHANGE UP (ref 4–41)
ANION GAP SERPL CALC-SCNC: 10 MMOL/L — SIGNIFICANT CHANGE UP (ref 7–14)
AST SERPL-CCNC: 23 U/L — SIGNIFICANT CHANGE UP (ref 4–40)
B PERT IGG+IGM PNL SER: CLEAR — SIGNIFICANT CHANGE UP
BILIRUB DIRECT SERPL-MCNC: <0.2 MG/DL — SIGNIFICANT CHANGE UP (ref 0–0.2)
BILIRUB INDIRECT FLD-MCNC: >0.1 MG/DL — SIGNIFICANT CHANGE UP (ref 0–1)
BILIRUB SERPL-MCNC: 0.3 MG/DL — SIGNIFICANT CHANGE UP (ref 0.2–1.2)
BLD GP AB SCN SERPL QL: NEGATIVE — SIGNIFICANT CHANGE UP
BUN SERPL-MCNC: 21 MG/DL — SIGNIFICANT CHANGE UP (ref 7–23)
CALCIUM SERPL-MCNC: 8.8 MG/DL — SIGNIFICANT CHANGE UP (ref 8.4–10.5)
CHLORIDE SERPL-SCNC: 107 MMOL/L — SIGNIFICANT CHANGE UP (ref 98–107)
CO2 SERPL-SCNC: 13 MMOL/L — LOW (ref 22–31)
COLOR FLD: YELLOW
COMMENT - FLUIDS: SIGNIFICANT CHANGE UP
COVID-19 SPIKE DOMAIN AB INTERP: POSITIVE
COVID-19 SPIKE DOMAIN ANTIBODY RESULT: 40.5 U/ML — HIGH
CREAT SERPL-MCNC: 1.61 MG/DL — HIGH (ref 0.5–1.3)
FLUID INTAKE SUBSTANCE CLASS: SIGNIFICANT CHANGE UP
FLUID SEGMENTED GRANULOCYTES: 1 % — SIGNIFICANT CHANGE UP
GLUCOSE SERPL-MCNC: 91 MG/DL — SIGNIFICANT CHANGE UP (ref 70–99)
HCT VFR BLD CALC: 32 % — LOW (ref 39–50)
HGB BLD-MCNC: 10.2 G/DL — LOW (ref 13–17)
INR BLD: 1.17 RATIO — HIGH (ref 0.88–1.16)
LDH SERPL L TO P-CCNC: 100 U/L — SIGNIFICANT CHANGE UP
LYMPHOCYTES # FLD: 42 % — SIGNIFICANT CHANGE UP
MAGNESIUM SERPL-MCNC: 1.6 MG/DL — SIGNIFICANT CHANGE UP (ref 1.6–2.6)
MCHC RBC-ENTMCNC: 31.8 PG — SIGNIFICANT CHANGE UP (ref 27–34)
MCHC RBC-ENTMCNC: 31.9 GM/DL — LOW (ref 32–36)
MCV RBC AUTO: 99.7 FL — SIGNIFICANT CHANGE UP (ref 80–100)
MESOTHL CELL # FLD: 1 % — SIGNIFICANT CHANGE UP
MONOS+MACROS # FLD: 56 % — SIGNIFICANT CHANGE UP
NRBC # BLD: 0 /100 WBCS — SIGNIFICANT CHANGE UP
NRBC # FLD: 0 K/UL — SIGNIFICANT CHANGE UP
PHOSPHATE SERPL-MCNC: 4.6 MG/DL — HIGH (ref 2.5–4.5)
PLATELET # BLD AUTO: 42 K/UL — LOW (ref 150–400)
POTASSIUM SERPL-MCNC: 5.6 MMOL/L — HIGH (ref 3.5–5.3)
POTASSIUM SERPL-SCNC: 5.6 MMOL/L — HIGH (ref 3.5–5.3)
PROT FLD-MCNC: 3.4 G/DL — SIGNIFICANT CHANGE UP
PROT SERPL-MCNC: 5.8 G/DL — LOW (ref 6–8.3)
PROTHROM AB SERPL-ACNC: 13.2 SEC — SIGNIFICANT CHANGE UP (ref 10.6–13.6)
RBC # BLD: 3.21 M/UL — LOW (ref 4.2–5.8)
RBC # FLD: 14.5 % — SIGNIFICANT CHANGE UP (ref 10.3–14.5)
RCV VOL RI: <1000 CELLS/UL — HIGH (ref 0–5)
RH IG SCN BLD-IMP: NEGATIVE — SIGNIFICANT CHANGE UP
SARS-COV-2 IGG+IGM SERPL QL IA: 40.5 U/ML — HIGH
SARS-COV-2 IGG+IGM SERPL QL IA: POSITIVE
SODIUM SERPL-SCNC: 130 MMOL/L — LOW (ref 135–145)
TOTAL NUCLEATED CELL COUNT, BODY FLUID: 344 CELLS/UL — HIGH (ref 0–5)
TSH SERPL-MCNC: 2.07 UIU/ML — SIGNIFICANT CHANGE UP (ref 0.27–4.2)
TUBE TYPE: SIGNIFICANT CHANGE UP
WBC # BLD: 4.36 K/UL — SIGNIFICANT CHANGE UP (ref 3.8–10.5)
WBC # FLD AUTO: 4.36 K/UL — SIGNIFICANT CHANGE UP (ref 3.8–10.5)

## 2021-06-23 PROCEDURE — 49083 ABD PARACENTESIS W/IMAGING: CPT | Mod: GC

## 2021-06-23 PROCEDURE — 99232 SBSQ HOSP IP/OBS MODERATE 35: CPT | Mod: GC

## 2021-06-23 PROCEDURE — 99232 SBSQ HOSP IP/OBS MODERATE 35: CPT | Mod: 25

## 2021-06-23 RX ORDER — LIDOCAINE HYDROCHLORIDE AND EPINEPHRINE 10; 10 MG/ML; UG/ML
20 INJECTION, SOLUTION INFILTRATION; PERINEURAL ONCE
Refills: 0 | Status: DISCONTINUED | OUTPATIENT
Start: 2021-06-23 | End: 2021-06-24

## 2021-06-23 RX ORDER — ALBUMIN HUMAN 25 %
150 VIAL (ML) INTRAVENOUS ONCE
Refills: 0 | Status: COMPLETED | OUTPATIENT
Start: 2021-06-23 | End: 2021-06-23

## 2021-06-23 RX ORDER — SODIUM CHLORIDE 9 MG/ML
250 INJECTION INTRAMUSCULAR; INTRAVENOUS; SUBCUTANEOUS ONCE
Refills: 0 | Status: COMPLETED | OUTPATIENT
Start: 2021-06-23 | End: 2021-06-23

## 2021-06-23 RX ADMIN — Medication 325 MILLIGRAM(S): at 11:20

## 2021-06-23 RX ADMIN — Medication 75 MILLILITER(S): at 18:30

## 2021-06-23 RX ADMIN — Medication 2000 UNIT(S): at 11:20

## 2021-06-23 RX ADMIN — MAGNESIUM OXIDE 400 MG ORAL TABLET 400 MILLIGRAM(S): 241.3 TABLET ORAL at 08:02

## 2021-06-23 RX ADMIN — Medication 100 MICROGRAM(S): at 05:47

## 2021-06-23 RX ADMIN — MAGNESIUM OXIDE 400 MG ORAL TABLET 400 MILLIGRAM(S): 241.3 TABLET ORAL at 20:06

## 2021-06-23 RX ADMIN — Medication 1 MILLIGRAM(S): at 11:20

## 2021-06-23 RX ADMIN — SODIUM CHLORIDE 500 MILLILITER(S): 9 INJECTION INTRAMUSCULAR; INTRAVENOUS; SUBCUTANEOUS at 23:00

## 2021-06-23 NOTE — PROGRESS NOTE ADULT - SUBJECTIVE AND OBJECTIVE BOX
Chief Complaint:  Patient is a 60y old  Male who presents with a chief complaint of Decompensated cirrhosis with ascites (22 Jun 2021 15:32)      Interval Events: no acute events overnight  - no new complaints today        Hospital Medications:  cholecalciferol 2000 Unit(s) Oral daily  ferrous    sulfate 325 milliGRAM(s) Oral daily  folic acid 1 milliGRAM(s) Oral daily  lactulose Syrup 10 Gram(s) Oral daily  levothyroxine 100 MICROGram(s) Oral daily  magnesium oxide 400 milliGRAM(s) Oral two times a day with meals      PMHX/PSHX:  Cirrhosis    Hypertension    CKD (chronic kidney disease)    Hypothyroid    S/P craniotomy    Presence of IVC filter    H/O fracture of leg            ROS:     General:  No weight loss, fevers, chills, night sweats, fatigue   Eyes:  No vision changes  ENT:  No sore throat, pain, runny nose  CV:  No chest pain, palpitations, dizziness   Resp:  No SOB, cough, wheezing  GI:  See HPI  :  No burning with urination, hematuria  Muscle:  No pain, weakness  Neuro:  No weakness/tingling, memory problems  Psych:  No fatigue, insomnia, mood problems, depression  Heme:  No easy bruisability  Skin:  No rash, edema      PHYSICAL EXAM:   GENERAL:  cachectic, no distress  HEENT:  NC/AT,  conjunctivae clear and pink  CHEST:  Full & symmetric excursion, no increased effort, breath sounds clear, spider angiomas  HEART:  Regular rate and rhythm  ABDOMEN:  Soft, non-tender, distended  EXTREMITIES:  no cyanosis, clubbing or edema  SKIN:  No rash/erythema  NEURO:  Alert, oriented, no asterixis    Vital Signs:  Vital Signs Last 24 Hrs  T(C): 36.8 (23 Jun 2021 10:46), Max: 37.1 (23 Jun 2021 06:56)  T(F): 98.2 (23 Jun 2021 10:46), Max: 98.8 (23 Jun 2021 06:56)  HR: 102 (23 Jun 2021 10:46) (63 - 102)  BP: 98/67 (23 Jun 2021 10:46) (92/55 - 120/80)  BP(mean): --  RR: 18 (23 Jun 2021 10:46) (17 - 18)  SpO2: 100% (23 Jun 2021 10:46) (100% - 100%)  Daily     Daily     LABS:                        10.2   4.36  )-----------( 42       ( 23 Jun 2021 07:26 )             32.0     06-23    130<L>  |  107  |  21  ----------------------------<  91  5.6<H>   |  13<L>  |  1.61<H>    Ca    8.8      23 Jun 2021 07:26  Phos  4.6     06-23  Mg     1.6     06-23    TPro  6.9  /  Alb  3.2<L>  /  TBili  0.4  /  DBili  x   /  AST  22  /  ALT  17  /  AlkPhos  88  06-22    LIVER FUNCTIONS - ( 22 Jun 2021 04:36 )  Alb: 3.2 g/dL / Pro: 6.9 g/dL / ALK PHOS: 88 U/L / ALT: 17 U/L / AST: 22 U/L / GGT: x           PT/INR - ( 23 Jun 2021 07:26 )   PT: 13.2 sec;   INR: 1.17 ratio         PTT - ( 21 Jun 2021 21:31 )  PTT:37.1 sec        Imaging:             Chief Complaint:  Patient is a 60y old  Male who presents with a chief complaint of Decompensated cirrhosis with ascites (22 Jun 2021 15:32)      Interval Events: no acute events overnight  - no new complaints today but abdominal distention has remained.     Hospital Medications:  cholecalciferol 2000 Unit(s) Oral daily  ferrous    sulfate 325 milliGRAM(s) Oral daily  folic acid 1 milliGRAM(s) Oral daily  lactulose Syrup 10 Gram(s) Oral daily  levothyroxine 100 MICROGram(s) Oral daily  magnesium oxide 400 milliGRAM(s) Oral two times a day with meals      PMHX/PSHX:  Cirrhosis    Hypertension    CKD (chronic kidney disease)    Hypothyroid    S/P craniotomy    Presence of IVC filter    H/O fracture of leg            ROS:     General:  No weight loss, fevers, chills, night sweats, fatigue   Eyes:  No vision changes  ENT:  No sore throat, pain, runny nose  CV:  No chest pain, palpitations, dizziness   Resp:  No SOB, cough, wheezing  GI:  See HPI  :  No burning with urination, hematuria  Muscle:  No pain, weakness  Neuro:  No weakness/tingling, memory problems  Psych:  No fatigue, insomnia, mood problems, depression  Heme:  No easy bruisability  Skin:  No rash, edema      PHYSICAL EXAM:   GENERAL:  cachectic, no distress  HEENT:  NC/AT,  conjunctivae clear and pink  CHEST:  Full & symmetric excursion, no increased effort, breath sounds clear, spider angiomas  HEART:  Regular rate and rhythm  ABDOMEN:  Soft, non-tender, distended  EXTREMITIES:  no cyanosis, clubbing or edema  SKIN:  No rash/erythema  NEURO:  Alert, oriented, no asterixis    Vital Signs:  Vital Signs Last 24 Hrs  T(C): 36.8 (23 Jun 2021 10:46), Max: 37.1 (23 Jun 2021 06:56)  T(F): 98.2 (23 Jun 2021 10:46), Max: 98.8 (23 Jun 2021 06:56)  HR: 102 (23 Jun 2021 10:46) (63 - 102)  BP: 98/67 (23 Jun 2021 10:46) (92/55 - 120/80)  BP(mean): --  RR: 18 (23 Jun 2021 10:46) (17 - 18)  SpO2: 100% (23 Jun 2021 10:46) (100% - 100%)  Daily     Daily     LABS:                        10.2   4.36  )-----------( 42       ( 23 Jun 2021 07:26 )             32.0     06-23    130<L>  |  107  |  21  ----------------------------<  91  5.6<H>   |  13<L>  |  1.61<H>    Ca    8.8      23 Jun 2021 07:26  Phos  4.6     06-23  Mg     1.6     06-23    TPro  6.9  /  Alb  3.2<L>  /  TBili  0.4  /  DBili  x   /  AST  22  /  ALT  17  /  AlkPhos  88  06-22    LIVER FUNCTIONS - ( 22 Jun 2021 04:36 )  Alb: 3.2 g/dL / Pro: 6.9 g/dL / ALK PHOS: 88 U/L / ALT: 17 U/L / AST: 22 U/L / GGT: x           PT/INR - ( 23 Jun 2021 07:26 )   PT: 13.2 sec;   INR: 1.17 ratio         PTT - ( 21 Jun 2021 21:31 )  PTT:37.1 sec        Imaging:

## 2021-06-23 NOTE — PHYSICAL THERAPY INITIAL EVALUATION ADULT - ADDITIONAL COMMENTS
Pt lives in a house with 15 steps to negotiate. Prior to admission, pt reports ambulating independently, no assistive device.    Pt was left seated on edge of bed, all needs met and call bell within reach, RN aware.

## 2021-06-23 NOTE — PROGRESS NOTE ADULT - SUBJECTIVE AND OBJECTIVE BOX
Patient is a 60y old  Male who presents with a chief complaint of abdominal distention (23 Jun 2021 11:12)    SUBJECTIVE / OVERNIGHT EVENTS:  Patient with stable abdominal distension. No abdominal pain. Patient mentioned having about 3 BMs yesterday that are loose. No fever, chills, chest pain, SOB, n/v. Pt expressed frustration with having to see so many doctors as an outpatient and stated he preferred to go back to [facility] because is 90 year-old mother is sick. Pt awaiting paracentesis.    MEDICATIONS  (STANDING):  cholecalciferol 2000 Unit(s) Oral daily  ferrous    sulfate 325 milliGRAM(s) Oral daily  folic acid 1 milliGRAM(s) Oral daily  lactulose Syrup 10 Gram(s) Oral daily  levothyroxine 100 MICROGram(s) Oral daily  magnesium oxide 400 milliGRAM(s) Oral two times a day with meals        Vital Signs Last 24 Hrs  T(C): 36.8 (23 Jun 2021 10:46), Max: 37.1 (23 Jun 2021 06:56)  T(F): 98.2 (23 Jun 2021 10:46), Max: 98.8 (23 Jun 2021 06:56)  HR: 102 (23 Jun 2021 10:46) (63 - 102)  BP: 98/67 (23 Jun 2021 10:46) (92/55 - 120/80)  RR: 18 (23 Jun 2021 10:46) (17 - 18)  SpO2: 100% (23 Jun 2021 10:46) (100% - 100%)      CAPILLARY BLOOD GLUCOSE  POCT Blood Glucose.: 91 mg/dL (22 Jun 2021 12:29)        PHYSICAL EXAM  GENERAL: NAD, chronically ill but non-toxic appearing  CHEST/LUNG: Clear to auscultation bilaterally; No wheeze  HEART: Regular rate and rhythm; No murmurs, rubs, or gallops  ABDOMEN: protuberant, Distended; nontender; Bowel sounds present  EXTREMITIES:  2+ Peripheral Pulses, No clubbing, cyanosis, or edema  PSYCH: AAOx3, good medical insight, conversant, cooperative  SKIN: scattered telangectasia on face and chest and arms b/l, visible superficial vessels on abdomen        LABS:                        10.2   4.36  )-----------( 42       ( 23 Jun 2021 07:26 )             32.0     06-23    130<L>  |  107  |  21  ----------------------------<  91  5.6<H>   |  13<L>  |  1.61<H>    Ca    8.8      23 Jun 2021 07:26  Phos  4.6     06-23  Mg     1.6     06-23    TPro  6.9  /  Alb  3.2<L>  /  TBili  0.4  /  DBili  x   /  AST  22  /  ALT  17  /  AlkPhos  88  06-22    PT/INR - ( 23 Jun 2021 07:26 )   PT: 13.2 sec;   INR: 1.17 ratio         PTT - ( 21 Jun 2021 21:31 )  PTT:37.1 sec          Consultant(s) Notes Reviewed:  yes  Care Discussed with Consultants/Other Providers:
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
## 2021-06-23 NOTE — PHYSICAL THERAPY INITIAL EVALUATION ADULT - PERTINENT HX OF CURRENT PROBLEM, REHAB EVAL
Pt is a 60 year old male presenting with abdominal distention. Found to have YOVANI with hyperkalemia. PMH: alcoholic cirrhosis, CKD.

## 2021-06-23 NOTE — PROGRESS NOTE ADULT - PROBLEM SELECTOR PLAN 3
Hepatology consulted, recs reviewed and appreciated  - fluid restrict, diuretics on hold in light of soft BP/hyperK  - low sodium diet (4.5-7g of salt daily)  - therapeutic/diag paracentesis today - will obtain cell count, albumin, protein, LDH, glucose, gram stain and culture, and cytology; will administer albumin 25% per L of ascitic fluid >5L  - agree with assessment of candidacy for TIPS given quickly recurring ascites as an outpatient as well as need for intermittent paracenteses if unable to tolerate diuretics due to abn electrolytes   - pt to f/u with Liver clinic 2 weeks from discharge

## 2021-06-23 NOTE — PROCEDURE NOTE - ADDITIONAL PROCEDURE DETAILS
post-procedure albumin ordered.  Diagnostic samples left at bedside and discussed with NP of primary team.

## 2021-06-23 NOTE — PROCEDURE NOTE - NSSITEPREP_SKIN_A_CORE
alcohol to sterilize site/Adherence to aseptic technique: hand hygiene prior to donning barriers (gown, gloves), don cap and mask, sterile drape over patient

## 2021-06-23 NOTE — PROGRESS NOTE ADULT - PROBLEM SELECTOR PLAN 2
likely in setting of YOVANI/CKD and potassium sparing diuretic, spironolactone. Medical management  - routine monitoring of electrolytes  - laxatives PRN - pt on lactulose

## 2021-06-23 NOTE — PROGRESS NOTE ADULT - ASSESSMENT
60-year-old male with decompensated alcoholic cirrhosis with recurrent ascites and CKD presenting from home with 5 weeks of increasing abdominal ascites, found to have YOVANI with hyperkalemia. Pt with mild hyperkalemia today and pt awaiting therapeutic paracentesis.

## 2021-06-23 NOTE — PROGRESS NOTE ADULT - PROBLEM SELECTOR PLAN 4
chronic, possibly in setting of YOVANI on CKD; chronic loose stool in setting of lactulose use  BUN wnl, denies alcohol intake x months,  Noted increase in lactate -- will get repeat level and pt to receive IV albumin post-large volume paracentesis

## 2021-06-23 NOTE — PHYSICAL THERAPY INITIAL EVALUATION ADULT - PATIENT PROFILE REVIEW, REHAB EVAL
PT orders received: increase as tolerated. Consult with DELISA FORD, patient may participate in PT evaluation./yes

## 2021-06-23 NOTE — PROGRESS NOTE ADULT - PROBLEM SELECTOR PLAN 1
Likely due to large volume ascites/hepatorenal syndrome while on diuretics. Renal and bladder US unremarkable, no signs of obstruction. Renal function relatively stable.  - avoid nephrotoxins, trend Cr  - continue to hold spironolactone and furosemide  - monitor I/Os

## 2021-06-24 ENCOUNTER — TRANSCRIPTION ENCOUNTER (OUTPATIENT)
Age: 61
End: 2021-06-24

## 2021-06-24 VITALS — WEIGHT: 141.98 LBS

## 2021-06-24 LAB
ANION GAP SERPL CALC-SCNC: 11 MMOL/L — SIGNIFICANT CHANGE UP (ref 7–14)
BASOPHILS # BLD AUTO: 0.01 K/UL — SIGNIFICANT CHANGE UP (ref 0–0.2)
BASOPHILS NFR BLD AUTO: 0.3 % — SIGNIFICANT CHANGE UP (ref 0–2)
BUN SERPL-MCNC: 22 MG/DL — SIGNIFICANT CHANGE UP (ref 7–23)
CALCIUM SERPL-MCNC: 8.8 MG/DL — SIGNIFICANT CHANGE UP (ref 8.4–10.5)
CHLORIDE SERPL-SCNC: 104 MMOL/L — SIGNIFICANT CHANGE UP (ref 98–107)
CO2 SERPL-SCNC: 14 MMOL/L — LOW (ref 22–31)
CREAT SERPL-MCNC: 1.51 MG/DL — HIGH (ref 0.5–1.3)
EOSINOPHIL # BLD AUTO: 0.06 K/UL — SIGNIFICANT CHANGE UP (ref 0–0.5)
EOSINOPHIL NFR BLD AUTO: 1.6 % — SIGNIFICANT CHANGE UP (ref 0–6)
GLUCOSE SERPL-MCNC: 97 MG/DL — SIGNIFICANT CHANGE UP (ref 70–99)
GRAM STN FLD: SIGNIFICANT CHANGE UP
HCT VFR BLD CALC: 27.9 % — LOW (ref 39–50)
HGB BLD-MCNC: 9.2 G/DL — LOW (ref 13–17)
IANC: 1.6 K/UL — SIGNIFICANT CHANGE UP (ref 1.5–8.5)
IMM GRANULOCYTES NFR BLD AUTO: 0.3 % — SIGNIFICANT CHANGE UP (ref 0–1.5)
LYMPHOCYTES # BLD AUTO: 1.66 K/UL — SIGNIFICANT CHANGE UP (ref 1–3.3)
LYMPHOCYTES # BLD AUTO: 43.7 % — SIGNIFICANT CHANGE UP (ref 13–44)
MAGNESIUM SERPL-MCNC: 1.5 MG/DL — LOW (ref 1.6–2.6)
MCHC RBC-ENTMCNC: 32.2 PG — SIGNIFICANT CHANGE UP (ref 27–34)
MCHC RBC-ENTMCNC: 33 GM/DL — SIGNIFICANT CHANGE UP (ref 32–36)
MCV RBC AUTO: 97.6 FL — SIGNIFICANT CHANGE UP (ref 80–100)
MONOCYTES # BLD AUTO: 0.46 K/UL — SIGNIFICANT CHANGE UP (ref 0–0.9)
MONOCYTES NFR BLD AUTO: 12.1 % — SIGNIFICANT CHANGE UP (ref 2–14)
NEUTROPHILS # BLD AUTO: 1.6 K/UL — LOW (ref 1.8–7.4)
NEUTROPHILS NFR BLD AUTO: 42 % — LOW (ref 43–77)
NRBC # BLD: 0 /100 WBCS — SIGNIFICANT CHANGE UP
NRBC # FLD: 0 K/UL — SIGNIFICANT CHANGE UP
PHOSPHATE SERPL-MCNC: 3.3 MG/DL — SIGNIFICANT CHANGE UP (ref 2.5–4.5)
PLATELET # BLD AUTO: 30 K/UL — LOW (ref 150–400)
POTASSIUM SERPL-MCNC: 5.2 MMOL/L — SIGNIFICANT CHANGE UP (ref 3.5–5.3)
POTASSIUM SERPL-SCNC: 5.2 MMOL/L — SIGNIFICANT CHANGE UP (ref 3.5–5.3)
RBC # BLD: 2.86 M/UL — LOW (ref 4.2–5.8)
RBC # FLD: 14.3 % — SIGNIFICANT CHANGE UP (ref 10.3–14.5)
SODIUM SERPL-SCNC: 129 MMOL/L — LOW (ref 135–145)
SPECIMEN SOURCE: SIGNIFICANT CHANGE UP
WBC # BLD: 3.8 K/UL — SIGNIFICANT CHANGE UP (ref 3.8–10.5)
WBC # FLD AUTO: 3.8 K/UL — SIGNIFICANT CHANGE UP (ref 3.8–10.5)

## 2021-06-24 PROCEDURE — 99239 HOSP IP/OBS DSCHRG MGMT >30: CPT

## 2021-06-24 PROCEDURE — 99232 SBSQ HOSP IP/OBS MODERATE 35: CPT | Mod: GC

## 2021-06-24 RX ORDER — MAGNESIUM SULFATE 500 MG/ML
2 VIAL (ML) INJECTION ONCE
Refills: 0 | Status: DISCONTINUED | OUTPATIENT
Start: 2021-06-24 | End: 2021-06-24

## 2021-06-24 RX ORDER — CHOLECALCIFEROL (VITAMIN D3) 125 MCG
2000 CAPSULE ORAL
Qty: 0 | Refills: 0 | DISCHARGE
Start: 2021-06-24

## 2021-06-24 RX ORDER — MAGNESIUM OXIDE 400 MG ORAL TABLET 241.3 MG
1 TABLET ORAL
Qty: 0 | Refills: 0 | DISCHARGE
Start: 2021-06-24

## 2021-06-24 RX ADMIN — LACTULOSE 10 GRAM(S): 10 SOLUTION ORAL at 15:52

## 2021-06-24 RX ADMIN — MAGNESIUM OXIDE 400 MG ORAL TABLET 400 MILLIGRAM(S): 241.3 TABLET ORAL at 07:48

## 2021-06-24 RX ADMIN — Medication 2000 UNIT(S): at 15:51

## 2021-06-24 RX ADMIN — Medication 100 MICROGRAM(S): at 06:35

## 2021-06-24 RX ADMIN — Medication 1 MILLIGRAM(S): at 15:51

## 2021-06-24 RX ADMIN — Medication 325 MILLIGRAM(S): at 15:52

## 2021-06-24 NOTE — PROGRESS NOTE ADULT - PROBLEM SELECTOR PLAN 8
c/w Vit D supplementation c/w Vit D supplementation      DISPO: Patient medically stable to be discharged home with outpatient f/u. Spent 40 min coordination discharge plan, outpatient appointments, and counseling patient on his condition.

## 2021-06-24 NOTE — DISCHARGE NOTE PROVIDER - PROVIDER TOKENS
PROVIDER:[TOKEN:[[id number]]],FREE:[LAST:[dilmetin],PHONE:[phone]],FAX:[(   )    -],ADDRESS:[Nephrology]]

## 2021-06-24 NOTE — DIETITIAN INITIAL EVALUATION ADULT. - ORAL INTAKE PTA/DIET HISTORY
Patient denies any changes in appetite/po intake PTA. States he consumes "[place]" foods at home. Adheres to low sodium diet and avoids fried foods.

## 2021-06-24 NOTE — PROGRESS NOTE ADULT - SUBJECTIVE AND OBJECTIVE BOX
Patient is a 60y old  Male who presents with a chief complaint of Decompensated cirrhosis with ascites (23 Jun 2021 12:03)    SUBJECTIVE / OVERNIGHT EVENTS:      MEDICATIONS  (STANDING):  cholecalciferol 2000 Unit(s) Oral daily  ferrous    sulfate 325 milliGRAM(s) Oral daily  folic acid 1 milliGRAM(s) Oral daily  lactulose Syrup 10 Gram(s) Oral daily  levothyroxine 100 MICROGram(s) Oral daily  lidocaine 1%/epinephrine 1:100,000 Inj 20 milliLiter(s) Local Injection once  magnesium oxide 400 milliGRAM(s) Oral two times a day with meals  magnesium sulfate  IVPB 2 Gram(s) IV Intermittent once    MEDICATIONS  (PRN):      Vital Signs Last 24 Hrs  T(C): 36.6 (24 Jun 2021 10:18), Max: 37.1 (23 Jun 2021 17:48)  T(F): 97.8 (24 Jun 2021 10:18), Max: 98.7 (23 Jun 2021 17:48)  HR: 89 (24 Jun 2021 10:18) (77 - 89)  BP: 92/54 (24 Jun 2021 10:18) (90/56 - 92/54)  BP(mean): --  RR: 18 (24 Jun 2021 10:18) (18 - 18)  SpO2: 100% (24 Jun 2021 10:18) (100% - 100%)  CAPILLARY BLOOD GLUCOSE        I&O's Summary    23 Jun 2021 07:01  -  24 Jun 2021 07:00  --------------------------------------------------------  IN: 300 mL / OUT: 0 mL / NET: 300 mL          PHYSICAL EXAM  GENERAL: NAD, well-developed  HEAD:  Atraumatic, Normocephalic  EYES: EOMI, PERRLA, conjunctiva and sclera clear  NECK: Supple, No JVD  CHEST/LUNG: Clear to auscultation bilaterally; No wheeze  HEART: Regular rate and rhythm; No murmurs, rubs, or gallops  ABDOMEN: Soft, Nontender, Nondistended; Bowel sounds present  EXTREMITIES:  2+ Peripheral Pulses, No clubbing, cyanosis, or edema  PSYCH: AAOx3  SKIN: No rashes or lesions    LABS:                        9.2    3.80  )-----------( 30       ( 24 Jun 2021 06:28 )             27.9     06-24    129<L>  |  104  |  22  ----------------------------<  97  5.2   |  14<L>  |  1.51<H>    Ca    8.8      24 Jun 2021 06:28  Phos  3.3     06-24  Mg     1.5     06-24    TPro  5.8<L>  /  Alb  3.0<L>  /  TBili  0.3  /  DBili  <0.2  /  AST  23  /  ALT  16  /  AlkPhos  84  06-23    PT/INR - ( 23 Jun 2021 07:26 )   PT: 13.2 sec;   INR: 1.17 ratio                   RADIOLOGY & ADDITIONAL TESTS:    Imaging Personally Reviewed:  Consultant(s) Notes Reviewed:    Care Discussed with Consultants/Other Providers:   Patient is a 60y old  Male who presents with a chief complaint of Decompensated cirrhosis with ascites (23 Jun 2021 12:03)    SUBJECTIVE / OVERNIGHT EVENTS:  Patient s/p therapeutic paracentesis yesterday with removal of ~8L. However, pt feels there is still more fluid there. He stated last time he had a paracentesis, his stomach was flatter and his umbilicus was in more. He denied any abdominal pain. No fever, chest pain, SOB, n/v. Patient did mention he has been noticing a decline in his urine output for a while now and has seen an outpatient nephrologist. He read the information regarding TIPS procedure and said if he is a candidate, it is something he will consider pursuing.     MEDICATIONS  (STANDING):  cholecalciferol 2000 Unit(s) Oral daily  ferrous    sulfate 325 milliGRAM(s) Oral daily  folic acid 1 milliGRAM(s) Oral daily  lactulose Syrup 10 Gram(s) Oral daily  levothyroxine 100 MICROGram(s) Oral daily  lidocaine 1%/epinephrine 1:100,000 Inj 20 milliLiter(s) Local Injection once  magnesium oxide 400 milliGRAM(s) Oral two times a day with meals  magnesium sulfate  IVPB 2 Gram(s) IV Intermittent once        Vital Signs Last 24 Hrs  T(C): 36.6 (24 Jun 2021 10:18), Max: 37.1 (23 Jun 2021 17:48)  T(F): 97.8 (24 Jun 2021 10:18), Max: 98.7 (23 Jun 2021 17:48)  HR: 89 (24 Jun 2021 10:18) (77 - 89)  BP: 92/54 (24 Jun 2021 10:18) (90/56 - 92/54)  RR: 18 (24 Jun 2021 10:18) (18 - 18)  SpO2: 100% (24 Jun 2021 10:18) (100% - 100%)        I&O's Summary    23 Jun 2021 07:01  -  24 Jun 2021 07:00  --------------------------------------------------------  IN: 300 mL / OUT: 0 mL / NET: 300 mL          PHYSICAL EXAM  GENERAL: NAD, chronically ill but non-toxic appearing  CHEST/LUNG: Clear to auscultation bilaterally; No wheeze  HEART: Regular rate and rhythm; No murmurs, rubs, or gallops  ABDOMEN: protuberant, markedly less distended; nontender; Bowel sounds present, RLQ dressing c/d/i  EXTREMITIES:  2+ Peripheral Pulses, No clubbing, cyanosis, or edema  PSYCH: AAOx3, good medical insight, conversant, cooperative  SKIN: scattered telangectasia on face and chest and arms b/l, visible superficial vessels on abdomen      LABS:                        9.2    3.80  )-----------( 30       ( 24 Jun 2021 06:28 )             27.9     06-24    129<L>  |  104  |  22  ----------------------------<  97  5.2   |  14<L>  |  1.51<H>    Ca    8.8      24 Jun 2021 06:28  Phos  3.3     06-24  Mg     1.5     06-24    TPro  5.8<L>  /  Alb  3.0<L>  /  TBili  0.3  /  DBili  <0.2  /  AST  23  /  ALT  16  /  AlkPhos  84  06-23    PT/INR - ( 23 Jun 2021 07:26 )   PT: 13.2 sec;   INR: 1.17 ratio                   RADIOLOGY & ADDITIONAL TESTS:    Imaging Personally Reviewed:  Consultant(s) Notes Reviewed:    Care Discussed with Consultants/Other Providers:

## 2021-06-24 NOTE — DIETITIAN INITIAL EVALUATION ADULT. - PERTINENT MEDS FT
cholecalciferol  ferrous    sulfate  folic acid  lactulose Syrup  levothyroxine  magnesium oxide  magnesium sulfate  IVPB

## 2021-06-24 NOTE — PROGRESS NOTE ADULT - PROBLEM SELECTOR PROBLEM 3
Ascites due to alcoholic cirrhosis

## 2021-06-24 NOTE — DIETITIAN INITIAL EVALUATION ADULT. - PERTINENT LABORATORY DATA
06-24 Na129 mmol/L<L> Glu 97 mg/dL K+ 5.2 mmol/L Cr  1.51 mg/dL<H> BUN 22 mg/dL 06-24 Phos 3.3 mg/dL 06-23 Alb 3.0 g/dL<L>

## 2021-06-24 NOTE — PROGRESS NOTE ADULT - PROBLEM SELECTOR PLAN 7
in setting of cirrhosis. Stable. No signs of bleeding.

## 2021-06-24 NOTE — PROGRESS NOTE ADULT - PROBLEM SELECTOR PLAN 4
chronic, possibly in setting of YOVANI on CKD; chronic loose stool in setting of lactulose use  BUN wnl, denies alcohol intake x months,  Noted increase in lactate -- will get repeat level and pt to receive IV albumin post-large volume paracentesis chronic, suspect due to renal disease. Stable. Pt to f/u with outpatient nephrology.

## 2021-06-24 NOTE — PROGRESS NOTE ADULT - PROBLEM SELECTOR PLAN 6
chronic/stable, improved H/H from prior admission  monitor/trend    c/w folic acid supplementation (low on outpt labs)

## 2021-06-24 NOTE — DISCHARGE NOTE PROVIDER - NSDCCPCAREPLAN_GEN_ALL_CORE_FT
PRINCIPAL DISCHARGE DIAGNOSIS  Diagnosis: Ascites due to alcoholic cirrhosis  Assessment and Plan of Treatment: You had a paracentesis performed on 6/23.  You can follow up with Dr. Lawton on July 21st at 1245.  You can follow up for pathology results and further medical management.      SECONDARY DISCHARGE DIAGNOSES  Diagnosis: Macrocytic anemia  Assessment and Plan of Treatment: Follow-up with your outpatient provider for further care/recommendations. Monitor for signs/symptoms indicating worsening of disease, such as, easy bleeding/bruising, pale skin, fatigue, dizziness, increased heart rate, or chest pain.      Diagnosis: Acute kidney injury superimposed on CKD  Assessment and Plan of Treatment: Monitor the kidney function at your nephrologist Dr.l Kyle July 16 at 3pm.    Diagnosis: Hyperkalemia  Assessment and Plan of Treatment: Follow up repeat potassium level at your nephrologist appointment on July 16 at 3pm.

## 2021-06-24 NOTE — DISCHARGE NOTE PROVIDER - CARE PROVIDER_API CALL
Michele Lawton (MD)  Gastroenterology; Internal Medicine; Transplant Hepatology  [address] [place] [address]  Phone: [phone]  Fax: [phone]  Follow Up Time:     [facility]   Nephrology  Phone: [phone]  Fax: (   )    -  Follow Up Time:

## 2021-06-24 NOTE — DIETITIAN INITIAL EVALUATION ADULT. - OTHER INFO
Met with patient at bedside. Patient endorses good appetite and PO intake in-house, consuming >% po intake in nursing flowsheet. Patient denies any nausea/vomiting/diarrhea/constipation or difficulty chewing and swallowing. +Abdominal distention with ascites, s/p paracentesis 6/23 with 7920mL fluids removed. RD reviewed therapeutic diet recommendations with patient. All nutrition related questions and concerns answered. Patient unable to provide weight related history or changes in weight. Of note, no weight recorded this admission. RN to obtain new weight.
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
## 2021-06-24 NOTE — DIETITIAN INITIAL EVALUATION ADULT. - PROBLEM SELECTOR PLAN 2
likely in setting of YOVANI  repeat BMP with improving Cr  s/p temporizing measures as well as sodium zirconium cyclosilicate   patient taking multiple supplements, juices daily, nutrition consult placed  repeat BMP pending  monitor on tele    Addendum - repeat BMP, still hyperkalemic, gave more insulin/D50; held off on further sodium zirconium cyclosilicate for now given sodium load of ~800mg in 10g dose, instead dosed daily lactulose; continue to monitor K

## 2021-06-24 NOTE — PROGRESS NOTE ADULT - ATTENDING COMMENTS
A 60-year-old man with hypothyroid, CKD, alcoholic cirrhosis decompensated by ascites s/p multiple LVPs  treated with diuretics, presented with worsening  abdominal distention.   Last drink reportedly was 1/2021. Furosemide was discontinued due to his kidney function. Patient is awake, alert and oriented x3, no asterixis. Eyes: not icteric, abdomen distended, no leg edema.   MELD-Na 23 on 6/22  driven more from hyponatremia. Labs from today Hb 10.2, Cr 1.61, no Liver tests .  INR 1.17    A/P: alcoholic cirrhosis decompensated by ascites, s/p LVP with recurrent worsening ascites.   Would recommend- trend Na, liver tests, and INR, diagnostic and therapeutic paracentesis,  hold diuretics continue current meds and  care per primary team.
A 60-year-old man with hypothyroid, CKD, alcoholic cirrhosis decompensated by ascites s/p multiple LVPs  treated with diuretics, presented with worsening  abdominal distention.     MELD-Na 23 on 6/22  driven more from hyponatremia. Labs from today Hb 9.2 without evidence of GI bleeding , Cr downtrending to 1.51, Na 129,  no liver tests today.  Patient is s/p LVP, negative for SBP.  abdomen distention decreased, and no leg edema. awake, alert and oriented x3, no asterixis    A/P: alcoholic cirrhosis decompensated by ascites, s/p multiple LVP, off diuretics due to hyponatremia.   Would recommend- trend Na, liver tests, and INR, Hb  diagnostic and therapeutic paracentesis, continue current meds, hepatology outpatient followup pin 1 month or sooner,  and  care per primary team.

## 2021-06-24 NOTE — DIETITIAN NUTRITION RISK NOTIFICATION - TREATMENT: THE FOLLOWING DIET HAS BEEN RECOMMENDED
Diet, Regular:   1000mL Fluid Restriction ([id number])  Low Sodium (06-22-21 @ 03:43) [Active]

## 2021-06-24 NOTE — DISCHARGE NOTE PROVIDER - NSDCMRMEDTOKEN_GEN_ALL_CORE_FT
cholecalciferol oral tablet: 2000 unit(s) orally once a day  CoQ10: 200 milligram(s) orally once a day  ferrous sulfate 324 mg (65 mg elemental iron) oral tablet: 1 tab(s) orally once a day  folic acid 1 mg oral tablet: 1 tab(s) orally once a day  lactulose 10 g/15 mL oral solution: 15 milliliter(s) orally once a day  levothyroxine 100 mcg (0.1 mg) oral tablet: 1 tab(s) orally once a day  magnesium oxide 400 mg oral tablet: 1 tab(s) orally 2 times a day (with meals)

## 2021-06-24 NOTE — DISCHARGE NOTE NURSING/CASE MANAGEMENT/SOCIAL WORK - PATIENT PORTAL LINK FT
You can access the FollowMyHealth Patient Portal offered by [facility] by registering at the following website: [email or web address]. By joining [employer]’s FollowMyHealth portal, you will also be able to view your health information using other applications (apps) compatible with our system.

## 2021-06-24 NOTE — PROGRESS NOTE ADULT - PROBLEM SELECTOR PLAN 2
likely in setting of YOVANI/CKD and potassium sparing diuretic, spironolactone. Medical management  - routine monitoring of electrolytes  - laxatives PRN - pt on lactulose likely in setting of YOVANI/CKD and potassium sparing diuretic, spironolactone. Medical management  - routine monitoring of electrolytes  - laxatives PRN - pt on lactulose  - d/c spironolactone upon discharge

## 2021-06-24 NOTE — DISCHARGE NOTE PROVIDER - HOSPITAL COURSE
60-year-old male with alcoholic cirrhosis, CKD, presenting from home with 5 weeks of increasing abdominal ascites, found to have YOVANI with hyperkalemia    Hospital Course:     Acute kidney injury superimposed on CKD.  Plan: Likely due to large volume ascites/hepatorenal syndrome while on diuretics. Renal and bladder US unremarkable, no signs of obstruction. Renal function relatively stable.  - avoid nephrotoxins, trend Cr  - continue to hold spironolactone and furosemide  - monitor I/Os.     Hyperkalemia.  Plan: likely in setting of YOVANI/CKD and potassium sparing diuretic, spironolactone. Medical management  - routine monitoring of electrolytes  - laxatives PRN - pt on lactulose.      Ascites due to alcoholic cirrhosis.  Plan: Hepatology consulted, recs reviewed and appreciated  - fluid restrict, diuretics on hold in light of soft BP/hyperK  - low sodium diet (4.5-7g of salt daily)  - therapeutic/diag paracentesis today - will obtain cell count, albumin, protein, LDH, glucose, gram stain and culture, and cytology; will administer albumin 25% per L of ascitic fluid >5L  - agree with assessment of candidacy for TIPS given quickly recurring ascites as an outpatient as well as need for intermittent paracenteses if unable to tolerate diuretics due to abn electrolytes   - pt to f/u with Liver clinic 2 weeks from discharge.      Metabolic acidosis.  Plan: chronic, possibly in setting of YOVANI on CKD; chronic loose stool in setting of lactulose use  BUN wnl, denies alcohol intake x months,  Noted increase in lactate -- will get repeat level and pt to receive IV albumin post-large volume paracentesis.      Hypothyroid.  Plan: c/w levothyroxine.      Macrocytic anemia. Plan: chronic/stable, improved H/H from prior admission  monitor/trend    c/w folic acid supplementation (low on outpt labs).     Thrombocytopenia.  Plan: in setting of cirrhosis. Stable. No signs of bleeding.     Dispo- On 6/24/21 patient medically optimized for discharge as per attending Dr. Rojas.  Follow up appointments scheduled with Hepatology and Nephrology as outpatient.  All medications have been reviewed prior to discharge.

## 2021-06-24 NOTE — DIETITIAN INITIAL EVALUATION ADULT. - PROBLEM SELECTOR PLAN 4
chronic, possibly in setting of YOVANI on CKD; chronic loose stool in setting of lactulose use  BUN wnl, denies alcohol intake xmonths, lactate wnl  repeat VBG pending  recent outpt CO2 of 11 on 6/07

## 2021-06-24 NOTE — PROGRESS NOTE ADULT - REASON FOR ADMISSION
abdominal distention
Decompensated cirrhosis with ascites
abdominal distention
Decompensated cirrhosis with ascites
Decompensated cirrhosis with ascites

## 2021-06-24 NOTE — PROGRESS NOTE ADULT - PROBLEM SELECTOR PLAN 1
Likely due to large volume ascites/hepatorenal syndrome while on diuretics. Renal and bladder US unremarkable, no signs of obstruction. Renal function relatively stable.  - avoid nephrotoxins, trend Cr  - continue to hold spironolactone and furosemide  - monitor I/Os Likely due to large volume ascites/hepatorenal syndrome while on diuretics. Renal and bladder US unremarkable, no signs of obstruction. Renal function improving, Cr. initially 1.71 now 1.51  - avoid nephrotoxins, trend Cr  - continue to hold spironolactone. Pt stated he was not taking furosemide  - monitor I/Os  - arranged for pt to f/u with his outpatient nephrologist, Dr. Kyle [phone] on 7/16 at 3pm

## 2021-06-24 NOTE — PROGRESS NOTE ADULT - SUBJECTIVE AND OBJECTIVE BOX
Chief Complaint:  Patient is a 60y old  Male who presents with a chief complaint of Decompensated cirrhosis with ascites (23 Jun 2021 12:03)      Interval Events: no acute events overnight  - s/p para yesterday 7.9L, neg for SBP  - reports he feels like all of the fluid was not drained      Hospital Medications:  cholecalciferol 2000 Unit(s) Oral daily  ferrous    sulfate 325 milliGRAM(s) Oral daily  folic acid 1 milliGRAM(s) Oral daily  lactulose Syrup 10 Gram(s) Oral daily  levothyroxine 100 MICROGram(s) Oral daily  lidocaine 1%/epinephrine 1:100,000 Inj 20 milliLiter(s) Local Injection once  magnesium oxide 400 milliGRAM(s) Oral two times a day with meals  magnesium sulfate  IVPB 2 Gram(s) IV Intermittent once      PMHX/PSHX:  Cirrhosis    Hypertension    CKD (chronic kidney disease)    Hypothyroid    S/P craniotomy    Presence of IVC filter    H/O fracture of leg            ROS:     General:  No weight loss, fevers, chills, night sweats, fatigue   Eyes:  No vision changes  ENT:  No sore throat, pain, runny nose  CV:  No chest pain, palpitations, dizziness   Resp:  No SOB, cough, wheezing  GI:  See HPI  :  No burning with urination, hematuria  Muscle:  No pain, weakness  Neuro:  No weakness/tingling, memory problems  Psych:  No fatigue, insomnia, mood problems, depression  Heme:  No easy bruisability  Skin:  No rash, edema      PHYSICAL EXAM:     GENERAL:  Well developed, no distress  HEENT:  NC/AT,  conjunctivae clear, sclera anicteric  CHEST:  Full & symmetric excursion, no increased effort w/ respirations  HEART:  Regular rhythm & rate  ABDOMEN:  Soft, non-tender, non-distended  EXTREMITIES:  no LE  edema  SKIN:  No rash/erythema/ecchymoses/petechiae/wounds/jaundice  NEURO:  Alert, oriented    Vital Signs:  Vital Signs Last 24 Hrs  T(C): 36.6 (24 Jun 2021 10:18), Max: 37.1 (23 Jun 2021 17:48)  T(F): 97.8 (24 Jun 2021 10:18), Max: 98.7 (23 Jun 2021 17:48)  HR: 89 (24 Jun 2021 10:18) (77 - 89)  BP: 92/54 (24 Jun 2021 10:18) (90/56 - 92/54)  BP(mean): --  RR: 18 (24 Jun 2021 10:18) (18 - 18)  SpO2: 100% (24 Jun 2021 10:18) (100% - 100%)  Daily     Daily     LABS:                        9.2    3.80  )-----------( 30       ( 24 Jun 2021 06:28 )             27.9     06-24    129<L>  |  104  |  22  ----------------------------<  97  5.2   |  14<L>  |  1.51<H>    Ca    8.8      24 Jun 2021 06:28  Phos  3.3     06-24  Mg     1.5     06-24    TPro  5.8<L>  /  Alb  3.0<L>  /  TBili  0.3  /  DBili  <0.2  /  AST  23  /  ALT  16  /  AlkPhos  84  06-23    LIVER FUNCTIONS - ( 23 Jun 2021 07:33 )  Alb: 3.0 g/dL / Pro: 5.8 g/dL / ALK PHOS: 84 U/L / ALT: 16 U/L / AST: 23 U/L / GGT: x           PT/INR - ( 23 Jun 2021 07:26 )   PT: 13.2 sec;   INR: 1.17 ratio                 Imaging:

## 2021-06-24 NOTE — PROGRESS NOTE ADULT - ASSESSMENT
60-year-old male with decompensated alcoholic cirrhosis with recurrent ascites and CKD presenting from home with 5 weeks of increasing abdominal ascites, found to have YOVANI with hyperkalemia. Pt with mild hyperkalemia today and pt awaiting therapeutic paracentesis.  60-year-old male with decompensated alcoholic cirrhosis with recurrent ascites and CKD presenting from home with 5 weeks of increasing abdominal ascites, found to have YOVANI with hyperkalemia. Hyperkalemia resolved and pt s/p therapeutic paracentesis. Renal function improving.

## 2021-06-24 NOTE — DIETITIAN INITIAL EVALUATION ADULT. - CHIEF COMPLAINT
61 y/o male with medical history of alcoholic cirrhosis, CKD presenting from home with hyperkalemia per chart review.

## 2021-06-24 NOTE — PROGRESS NOTE ADULT - PROBLEM SELECTOR PLAN 3
Hepatology consulted, recs reviewed and appreciated  - fluid restrict, diuretics on hold in light of soft BP/hyperK  - low sodium diet (4.5-7g of salt daily)  - therapeutic/diag paracentesis today - will obtain cell count, albumin, protein, LDH, glucose, gram stain and culture, and cytology; will administer albumin 25% per L of ascitic fluid >5L  - agree with assessment of candidacy for TIPS given quickly recurring ascites as an outpatient as well as need for intermittent paracenteses if unable to tolerate diuretics due to abn electrolytes   - pt to f/u with Liver clinic 2 weeks from discharge Hepatology consulted, recs reviewed and appreciated. Ascitic fluid analysis negative for SBP. Gram stain no organisms seen.  - fluid restrict  - continue to hold spironolactone and other diuretics for now  - low sodium diet (4.5-7g of salt daily)  - f/u ascitic fluid culture, and cytology  - agree with assessment of candidacy for TIPS given quickly recurring ascites as an outpatient as well as need for intermittent paracenteses since pt seems to be intolerant to diuretics due to abn electrolytes   - pt to f/u with Liver clinic on 7/21/21 with Dr. Lawton. Spoke with pt's previous outpatient GI doctor, Dr. Berger today [phone] who stated she only saw the patient once after his May 2021 hospitalization. At that time, she recommended for patient to increase spironolactone dose to up to 100mg daily if he can tolerate it and to f/u with nephrology. At that time, patient mentioned he was thinking of going back to [facility] to be with his mother and get continued care there. Dr. Kothari is agreeable for pt to continue care with [facility] liver clinic instead of her since he will likely need TIPS procedure in the future

## 2021-06-28 LAB
CULTURE RESULTS: SIGNIFICANT CHANGE UP
SPECIMEN SOURCE: SIGNIFICANT CHANGE UP

## 2021-07-21 ENCOUNTER — APPOINTMENT (OUTPATIENT)
Dept: HEPATOLOGY | Facility: CLINIC | Age: 61
End: 2021-07-21

## 2021-07-24 LAB
CULTURE RESULTS: SIGNIFICANT CHANGE UP
SPECIMEN SOURCE: SIGNIFICANT CHANGE UP

## 2021-12-08 NOTE — ED ADULT NURSE NOTE - OBJECTIVE STATEMENT
English received to room 19. complains of abd pain, back pain, abd distention and mild sob x 3-4 months. abdomen is firm and distended. denies chest pain. last drink 3 months ago. has b/l lower leg edema, +3 to left, +1 to right. labs sent. pt awaiting radiology/results in no acute distress.

## 2022-05-14 RX ORDER — FUROSEMIDE 40 MG
1 TABLET ORAL
Qty: 0 | Refills: 0 | DISCHARGE

## 2022-05-14 RX ORDER — LACTULOSE 10 G/15ML
15 SOLUTION ORAL
Qty: 0 | Refills: 0 | DISCHARGE

## 2022-05-14 RX ORDER — CHOLECALCIFEROL (VITAMIN D3) 125 MCG
0 CAPSULE ORAL
Qty: 0 | Refills: 0 | DISCHARGE

## 2022-05-14 RX ORDER — FERROUS SULFATE 325(65) MG
1 TABLET ORAL
Qty: 0 | Refills: 0 | DISCHARGE

## 2022-05-14 RX ORDER — UBIDECARENONE 100 MG
200 CAPSULE ORAL
Qty: 0 | Refills: 0 | DISCHARGE

## 2022-05-14 RX ORDER — LISINOPRIL 2.5 MG/1
1 TABLET ORAL
Qty: 0 | Refills: 0 | DISCHARGE

## 2022-05-14 RX ORDER — FOLIC ACID 0.8 MG
1 TABLET ORAL
Qty: 0 | Refills: 0 | DISCHARGE

## 2022-05-14 RX ORDER — LEVOTHYROXINE SODIUM 125 MCG
1 TABLET ORAL
Qty: 0 | Refills: 0 | DISCHARGE

## 2023-01-11 NOTE — PATIENT PROFILE ADULT - SAFE PLACE TO LIVE
no
Patient's first and last name, , procedure, and correct site confirmed prior to the start of procedure.
Patient's first and last name, , procedure, and correct site confirmed prior to the start of procedure.

## 2023-01-16 NOTE — ED ADULT NURSE NOTE - FALL HARM RISK TYPE OF ASSESSMENT
Port accessed for CT scan without difficulty, blood return noted, saline locked.     Post procedure port was heparin locked and deaccessed per protocol, tolerated well. Instructed patient to have port reflushed with heparin in 28 days if not accessed prior, patient v/u.    
Daily Assessment
